# Patient Record
Sex: MALE | Race: BLACK OR AFRICAN AMERICAN | NOT HISPANIC OR LATINO | Employment: OTHER | URBAN - METROPOLITAN AREA
[De-identification: names, ages, dates, MRNs, and addresses within clinical notes are randomized per-mention and may not be internally consistent; named-entity substitution may affect disease eponyms.]

---

## 2023-03-07 DIAGNOSIS — I10 PRIMARY HYPERTENSION: Primary | ICD-10-CM

## 2023-03-07 RX ORDER — CARVEDILOL 25 MG/1
25 TABLET ORAL
Qty: 180 TABLET | Refills: 1 | Status: SHIPPED | OUTPATIENT
Start: 2023-03-07 | End: 2023-03-08 | Stop reason: SDUPTHER

## 2023-03-08 DIAGNOSIS — I10 PRIMARY HYPERTENSION: ICD-10-CM

## 2023-03-08 RX ORDER — CARVEDILOL 25 MG/1
25 TABLET ORAL
Qty: 180 TABLET | Refills: 1 | Status: SHIPPED | OUTPATIENT
Start: 2023-03-08 | End: 2023-11-27 | Stop reason: SDUPTHER

## 2023-06-27 ENCOUNTER — APPOINTMENT (OUTPATIENT)
Dept: PRIMARY CARE | Facility: CLINIC | Age: 63
End: 2023-06-27
Payer: COMMERCIAL

## 2023-08-22 DIAGNOSIS — I10 ESSENTIAL (PRIMARY) HYPERTENSION: ICD-10-CM

## 2023-08-22 RX ORDER — HYDRALAZINE HYDROCHLORIDE 10 MG/1
10 TABLET, FILM COATED ORAL 3 TIMES DAILY
Qty: 270 TABLET | Refills: 1 | Status: SHIPPED | OUTPATIENT
Start: 2023-08-22 | End: 2024-03-08

## 2023-10-23 ENCOUNTER — TELEPHONE (OUTPATIENT)
Dept: HEMATOLOGY/ONCOLOGY | Facility: CLINIC | Age: 63
End: 2023-10-23
Payer: COMMERCIAL

## 2023-10-23 NOTE — TELEPHONE ENCOUNTER
Multiple messages  Patient been non compliant with fup  Left message again to contact us if interested in follow up.

## 2023-11-14 ENCOUNTER — TELEPHONE (OUTPATIENT)
Dept: PRIMARY CARE | Facility: CLINIC | Age: 63
End: 2023-11-14
Payer: COMMERCIAL

## 2023-11-14 DIAGNOSIS — I13.0 HYPERTENSIVE HEART DISEASE WITH CONGESTIVE HEART FAILURE AND CHRONIC KIDNEY DISEASE, UNSPECIFIED CKD STAGE, UNSPECIFIED HEART FAILURE TYPE (MULTI): Primary | ICD-10-CM

## 2023-11-27 ENCOUNTER — OFFICE VISIT (OUTPATIENT)
Dept: PRIMARY CARE | Facility: CLINIC | Age: 63
End: 2023-11-27
Payer: COMMERCIAL

## 2023-11-27 ENCOUNTER — ANCILLARY PROCEDURE (OUTPATIENT)
Dept: RADIOLOGY | Facility: CLINIC | Age: 63
End: 2023-11-27
Payer: COMMERCIAL

## 2023-11-27 VITALS
WEIGHT: 315 LBS | TEMPERATURE: 97.5 F | HEART RATE: 71 BPM | HEIGHT: 69 IN | BODY MASS INDEX: 46.65 KG/M2 | SYSTOLIC BLOOD PRESSURE: 220 MMHG | OXYGEN SATURATION: 97 % | DIASTOLIC BLOOD PRESSURE: 120 MMHG

## 2023-11-27 DIAGNOSIS — M25.531 WRIST PAIN, ACUTE, RIGHT: Primary | ICD-10-CM

## 2023-11-27 DIAGNOSIS — M25.531 WRIST PAIN, ACUTE, RIGHT: ICD-10-CM

## 2023-11-27 DIAGNOSIS — I13.0 HYPERTENSIVE HEART DISEASE WITH CONGESTIVE HEART FAILURE AND CHRONIC KIDNEY DISEASE, UNSPECIFIED CKD STAGE, UNSPECIFIED HEART FAILURE TYPE (MULTI): ICD-10-CM

## 2023-11-27 DIAGNOSIS — I10 PRIMARY HYPERTENSION: ICD-10-CM

## 2023-11-27 PROCEDURE — 99214 OFFICE O/P EST MOD 30 MIN: CPT | Performed by: FAMILY MEDICINE

## 2023-11-27 PROCEDURE — 1036F TOBACCO NON-USER: CPT | Performed by: FAMILY MEDICINE

## 2023-11-27 PROCEDURE — 73110 X-RAY EXAM OF WRIST: CPT | Mod: RIGHT SIDE | Performed by: RADIOLOGY

## 2023-11-27 PROCEDURE — 3008F BODY MASS INDEX DOCD: CPT | Performed by: FAMILY MEDICINE

## 2023-11-27 PROCEDURE — 3077F SYST BP >= 140 MM HG: CPT | Performed by: FAMILY MEDICINE

## 2023-11-27 PROCEDURE — 3080F DIAST BP >= 90 MM HG: CPT | Performed by: FAMILY MEDICINE

## 2023-11-27 PROCEDURE — 73110 X-RAY EXAM OF WRIST: CPT | Mod: RT,FY

## 2023-11-27 RX ORDER — CARVEDILOL 25 MG/1
25 TABLET ORAL
Qty: 180 TABLET | Refills: 1 | Status: SHIPPED | OUTPATIENT
Start: 2023-11-27 | End: 2024-05-25

## 2023-11-27 RX ORDER — CHLORTHALIDONE 25 MG/1
TABLET ORAL
COMMUNITY
Start: 2023-11-14 | End: 2024-02-22

## 2023-11-27 RX ORDER — GABAPENTIN 100 MG/1
100 CAPSULE ORAL
Qty: 90 CAPSULE | Refills: 0 | Status: SHIPPED | OUTPATIENT
Start: 2023-11-27

## 2023-11-27 RX ORDER — TRAMADOL HYDROCHLORIDE 50 MG/1
50 TABLET ORAL EVERY 6 HOURS PRN
Qty: 15 TABLET | Refills: 0 | Status: SHIPPED | OUTPATIENT
Start: 2023-11-27 | End: 2023-12-04

## 2023-11-27 RX ORDER — APIXABAN 5 MG/1
5 TABLET, FILM COATED ORAL 2 TIMES DAILY
COMMUNITY
Start: 2023-06-16

## 2023-11-27 RX ORDER — GABAPENTIN 100 MG/1
1 CAPSULE ORAL
COMMUNITY
Start: 2023-01-13 | End: 2023-11-27 | Stop reason: SDUPTHER

## 2023-11-27 ASSESSMENT — PATIENT HEALTH QUESTIONNAIRE - PHQ9
1. LITTLE INTEREST OR PLEASURE IN DOING THINGS: NOT AT ALL
SUM OF ALL RESPONSES TO PHQ9 QUESTIONS 1 AND 2: 0
2. FEELING DOWN, DEPRESSED OR HOPELESS: NOT AT ALL

## 2023-11-27 ASSESSMENT — ENCOUNTER SYMPTOMS
DEPRESSION: 0
LOSS OF SENSATION IN FEET: 0
OCCASIONAL FEELINGS OF UNSTEADINESS: 0

## 2023-11-27 ASSESSMENT — PAIN SCALES - GENERAL: PAINLEVEL: 10-WORST PAIN EVER

## 2023-11-27 NOTE — PROGRESS NOTES
"Subjective   Patient ID: Rei Alcocer is a 62 y.o. male who presents for Wrist Pain and Joint Swelling.  Seeing prostate providers, no other specialists currently.  Did not take BP meds this afternoon.  Had run out of one of the medications (carvedilol?).    Complains of right wrist pain starting yesterday, acute in onset.  No trauma  Started light dumbbells.  No redness or warmth.  No numbness or tingling.  Wearing wrist brace for support.  Pain is worse with movement at wrist.  Able to move fingers without difficulty.  Took a gabapentin from wife and it helped some with the pain.  Had stopped taking his own gabapentin, but would like to restart.    I have personally reviewed the OARRS report for this patient.  This report is documented into the EMR.  I have considered the risks of abuse, dependence, addiction and diversion.          Review of Systems    Objective   BP (!) 220/120 (BP Location: Left arm, Patient Position: Sitting, BP Cuff Size: Large adult)   Pulse 71   Temp 36.4 °C (97.5 °F) (Oral)   Ht 1.753 m (5' 9\")   Wt 150 kg (330 lb)   SpO2 97%   BMI 48.73 kg/m²     Physical Exam  Vitals (repeat BP no better) reviewed.   Constitutional:       Appearance: Normal appearance.   Cardiovascular:      Rate and Rhythm: Normal rate and regular rhythm.   Pulmonary:      Effort: Pulmonary effort is normal.      Breath sounds: Normal breath sounds.   Musculoskeletal:      Right wrist: Swelling and tenderness (more on ulnar side) present. No snuff box tenderness or crepitus. Decreased range of motion. Normal pulse.      Right hand: Swelling (puffy) present. No deformity, tenderness or bony tenderness. Normal range of motion. Normal strength.      Right lower leg: No edema.      Left lower leg: No edema.   Neurological:      Mental Status: He is alert.           Assessment/Plan   Problem List Items Addressed This Visit       Hypertensive heart disease with congestive heart failure and chronic kidney disease " (CMS/Formerly [facility])    Relevant Medications    carvedilol (Coreg) 25 mg tablet    Other Relevant Orders    Disability Placard     Other Visit Diagnoses       Wrist pain, acute, right    -  Primary    Relevant Medications    traMADol (Ultram) 50 mg tablet    gabapentin (Neurontin) 100 mg capsule    Other Relevant Orders    XR wrist right 3+ views    Primary hypertension        Relevant Medications    carvedilol (Coreg) 25 mg tablet

## 2023-11-28 NOTE — PATIENT INSTRUCTIONS
Acute right wrist pain without obvious trauma.  Check stat X-ray to rule out fracture.  For pain, tramadol sent to pharmacy for limited use.  Continue wrist splint for support.  If no fracture, may treat for possible gout, although there is no redness or warmth.    Gabapentin restarted.    BP extremely high.  Take medication when you get home.  Carvedilol refilled, since may have been out.  Monitor BP at home.  Follow up for routine check.

## 2023-11-29 DIAGNOSIS — M25.531 WRIST PAIN, ACUTE, RIGHT: Primary | ICD-10-CM

## 2023-11-29 RX ORDER — METHYLPREDNISOLONE 4 MG/1
TABLET ORAL
Qty: 21 TABLET | Refills: 0 | Status: SHIPPED | OUTPATIENT
Start: 2023-11-29 | End: 2024-02-07 | Stop reason: WASHOUT

## 2023-12-14 ENCOUNTER — TELEPHONE (OUTPATIENT)
Dept: PRIMARY CARE | Facility: CLINIC | Age: 63
End: 2023-12-14
Payer: COMMERCIAL

## 2023-12-14 DIAGNOSIS — M25.531 WRIST PAIN, ACUTE, RIGHT: ICD-10-CM

## 2023-12-14 DIAGNOSIS — R73.09 ELEVATED GLUCOSE: ICD-10-CM

## 2023-12-14 DIAGNOSIS — I10 PRIMARY HYPERTENSION: Primary | ICD-10-CM

## 2023-12-29 ENCOUNTER — TELEPHONE (OUTPATIENT)
Dept: HEMATOLOGY/ONCOLOGY | Facility: HOSPITAL | Age: 63
End: 2023-12-29
Payer: COMMERCIAL

## 2023-12-29 NOTE — TELEPHONE ENCOUNTER
RN called and left message on voice mail for patient to call back. Call back instructions reviewed.

## 2024-01-02 ENCOUNTER — PATIENT OUTREACH (OUTPATIENT)
Dept: HEMATOLOGY/ONCOLOGY | Facility: HOSPITAL | Age: 64
End: 2024-01-02
Payer: COMMERCIAL

## 2024-01-02 NOTE — PROGRESS NOTES
RN talked to patient. Patient asked why he was seeing this patient and RN told hime because his counts are low and that Dr. Perez referred him. Patient notified of date, time and place. Understanding voiced. RN could not tell for sure that the patient will show for appointment. Call back instructions reviewed.

## 2024-01-03 ENCOUNTER — LAB (OUTPATIENT)
Dept: LAB | Facility: HOSPITAL | Age: 64
End: 2024-01-03
Payer: COMMERCIAL

## 2024-01-03 ENCOUNTER — OFFICE VISIT (OUTPATIENT)
Dept: HEMATOLOGY/ONCOLOGY | Facility: HOSPITAL | Age: 64
End: 2024-01-03
Payer: COMMERCIAL

## 2024-01-03 VITALS
SYSTOLIC BLOOD PRESSURE: 216 MMHG | RESPIRATION RATE: 20 BRPM | OXYGEN SATURATION: 98 % | DIASTOLIC BLOOD PRESSURE: 92 MMHG | TEMPERATURE: 97.7 F | HEART RATE: 64 BPM

## 2024-01-03 DIAGNOSIS — D61.818 PANCYTOPENIA (MULTI): Primary | ICD-10-CM

## 2024-01-03 DIAGNOSIS — D61.818 PANCYTOPENIA (MULTI): ICD-10-CM

## 2024-01-03 DIAGNOSIS — M25.531 WRIST PAIN, ACUTE, RIGHT: ICD-10-CM

## 2024-01-03 DIAGNOSIS — I10 PRIMARY HYPERTENSION: ICD-10-CM

## 2024-01-03 DIAGNOSIS — R73.09 ELEVATED GLUCOSE: ICD-10-CM

## 2024-01-03 LAB
ALBUMIN SERPL BCP-MCNC: 3.7 G/DL (ref 3.4–5)
ALP SERPL-CCNC: 85 U/L (ref 33–136)
ALT SERPL W P-5'-P-CCNC: 8 U/L (ref 10–52)
ANION GAP SERPL CALC-SCNC: 12 MMOL/L (ref 10–20)
APTT PPP: 59 SECONDS (ref 27–38)
AST SERPL W P-5'-P-CCNC: 13 U/L (ref 9–39)
B2 GLYCOPROT1 IGA SER-ACNC: 0.9 U/ML
B2 GLYCOPROT1 IGG SER-ACNC: <1.4 U/ML
B2 GLYCOPROT1 IGM SER-ACNC: 1.8 U/ML
BASOPHILS # BLD AUTO: 0.02 X10*3/UL (ref 0–0.1)
BASOPHILS NFR BLD AUTO: 0.4 %
BILIRUB DIRECT SERPL-MCNC: 0.1 MG/DL (ref 0–0.3)
BILIRUB SERPL-MCNC: 0.5 MG/DL (ref 0–1.2)
BUN SERPL-MCNC: 26 MG/DL (ref 6–23)
CALCIUM SERPL-MCNC: 9.4 MG/DL (ref 8.6–10.3)
CARDIOLIPIN IGA SERPL-ACNC: 0.6 APL U/ML
CARDIOLIPIN IGG SER IA-ACNC: <1.6 GPL U/ML
CARDIOLIPIN IGM SER IA-ACNC: 1 MPL U/ML
CENTROMERE B AB SER-ACNC: <0.2 AI
CHLORIDE SERPL-SCNC: 105 MMOL/L (ref 98–107)
CHOLEST SERPL-MCNC: 172 MG/DL (ref 0–199)
CHOLESTEROL/HDL RATIO: 3.1
CHROMATIN AB SERPL-ACNC: <0.2 AI
CO2 SERPL-SCNC: 25 MMOL/L (ref 21–32)
CREAT SERPL-MCNC: 1.97 MG/DL (ref 0.5–1.3)
CRP SERPL-MCNC: 5.91 MG/DL
D DIMER PPP FEU-MCNC: 1309 NG/ML FEU
DAT-POLYSPECIFIC: NORMAL
DSDNA AB SER-ACNC: <1 IU/ML
ENA JO1 AB SER QL IA: <0.2 AI
ENA RNP AB SER IA-ACNC: 0.4 AI
ENA SCL70 AB SER QL IA: <0.2 AI
ENA SM AB SER IA-ACNC: <0.2 AI
ENA SM+RNP AB SER QL IA: <0.2 AI
ENA SS-A AB SER IA-ACNC: <0.2 AI
ENA SS-B AB SER IA-ACNC: <0.2 AI
EOSINOPHIL # BLD AUTO: 0.15 X10*3/UL (ref 0–0.7)
EOSINOPHIL NFR BLD AUTO: 3.2 %
ERYTHROCYTE [DISTWIDTH] IN BLOOD BY AUTOMATED COUNT: 15.1 % (ref 11.5–14.5)
ERYTHROCYTE [SEDIMENTATION RATE] IN BLOOD BY WESTERGREN METHOD: 95 MM/H (ref 0–20)
EST. AVERAGE GLUCOSE BLD GHB EST-MCNC: 123 MG/DL
FERRITIN SERPL-MCNC: 195 NG/ML (ref 20–300)
FIBRINOGEN PPP-MCNC: 614 MG/DL (ref 200–400)
GFR SERPL CREATININE-BSD FRML MDRD: 38 ML/MIN/1.73M*2
GLUCOSE SERPL-MCNC: 91 MG/DL (ref 74–99)
HBA1C MFR BLD: 5.9 %
HCT VFR BLD AUTO: 38.9 % (ref 41–52)
HCV AB SER QL: NONREACTIVE
HDLC SERPL-MCNC: 56 MG/DL
HGB BLD-MCNC: 12.5 G/DL (ref 13.5–17.5)
HGB RETIC QN: 29 PG (ref 28–38)
HIV 1+2 AB+HIV1 P24 AG SERPL QL IA: NONREACTIVE
IMM GRANULOCYTES # BLD AUTO: 0.01 X10*3/UL (ref 0–0.7)
IMM GRANULOCYTES NFR BLD AUTO: 0.2 % (ref 0–0.9)
IMMATURE RETIC FRACTION: 22.7 %
INR PPP: 1.1 (ref 0.9–1.1)
IRON SATN MFR SERPL: 17 % (ref 25–45)
IRON SERPL-MCNC: 42 UG/DL (ref 35–150)
LDH SERPL L TO P-CCNC: 156 U/L (ref 84–246)
LDLC SERPL CALC-MCNC: 95 MG/DL
LYMPHOCYTES # BLD AUTO: 0.49 X10*3/UL (ref 1.2–4.8)
LYMPHOCYTES NFR BLD AUTO: 10.5 %
MCH RBC QN AUTO: 26.4 PG (ref 26–34)
MCHC RBC AUTO-ENTMCNC: 32.1 G/DL (ref 32–36)
MCV RBC AUTO: 82 FL (ref 80–100)
MONOCYTES # BLD AUTO: 0.39 X10*3/UL (ref 0.1–1)
MONOCYTES NFR BLD AUTO: 8.4 %
NEUTROPHILS # BLD AUTO: 3.6 X10*3/UL (ref 1.2–7.7)
NEUTROPHILS NFR BLD AUTO: 77.3 %
NON HDL CHOLESTEROL: 116 MG/DL (ref 0–149)
NRBC BLD-RTO: 0 /100 WBCS (ref 0–0)
PLATELET # BLD AUTO: 160 X10*3/UL (ref 150–450)
POTASSIUM SERPL-SCNC: 3.5 MMOL/L (ref 3.5–5.3)
PROT SERPL-MCNC: 7.5 G/DL (ref 6.4–8.2)
PROT SERPL-MCNC: 8.2 G/DL (ref 6.4–8.2)
PROTHROMBIN TIME: 12.5 SECONDS (ref 9.8–12.8)
RBC # BLD AUTO: 4.73 X10*6/UL (ref 4.5–5.9)
RETICS #: 0.07 X10*6/UL (ref 0.02–0.12)
RETICS/RBC NFR AUTO: 1.5 % (ref 0.5–2)
RIBOSOMAL P AB SER-ACNC: <0.2 AI
SODIUM SERPL-SCNC: 138 MMOL/L (ref 136–145)
TIBC SERPL-MCNC: 247 UG/DL (ref 240–445)
TRIGL SERPL-MCNC: 106 MG/DL (ref 0–149)
TSH SERPL-ACNC: 2.28 MIU/L (ref 0.44–3.98)
UIBC SERPL-MCNC: 205 UG/DL (ref 110–370)
URATE SERPL-MCNC: 10.5 MG/DL (ref 4–7.5)
VIT B12 SERPL-MCNC: 385 PG/ML (ref 211–911)
VLDL: 21 MG/DL (ref 0–40)
WBC # BLD AUTO: 4.7 X10*3/UL (ref 4.4–11.3)

## 2024-01-03 PROCEDURE — 80053 COMPREHEN METABOLIC PANEL: CPT

## 2024-01-03 PROCEDURE — 84155 ASSAY OF PROTEIN SERUM: CPT | Mod: 59

## 2024-01-03 PROCEDURE — 82728 ASSAY OF FERRITIN: CPT

## 2024-01-03 PROCEDURE — 86140 C-REACTIVE PROTEIN: CPT

## 2024-01-03 PROCEDURE — 86880 COOMBS TEST DIRECT: CPT

## 2024-01-03 PROCEDURE — 84165 PROTEIN E-PHORESIS SERUM: CPT

## 2024-01-03 PROCEDURE — 85384 FIBRINOGEN ACTIVITY: CPT

## 2024-01-03 PROCEDURE — 85652 RBC SED RATE AUTOMATED: CPT

## 2024-01-03 PROCEDURE — 87389 HIV-1 AG W/HIV-1&-2 AB AG IA: CPT

## 2024-01-03 PROCEDURE — 86146 BETA-2 GLYCOPROTEIN ANTIBODY: CPT

## 2024-01-03 PROCEDURE — 86023 IMMUNOGLOBULIN ASSAY: CPT

## 2024-01-03 PROCEDURE — 85610 PROTHROMBIN TIME: CPT

## 2024-01-03 PROCEDURE — 85025 COMPLETE CBC W/AUTO DIFF WBC: CPT

## 2024-01-03 PROCEDURE — 84550 ASSAY OF BLOOD/URIC ACID: CPT

## 2024-01-03 PROCEDURE — 82607 VITAMIN B-12: CPT

## 2024-01-03 PROCEDURE — 3008F BODY MASS INDEX DOCD: CPT | Performed by: INTERNAL MEDICINE

## 2024-01-03 PROCEDURE — 84165 PROTEIN E-PHORESIS SERUM: CPT | Performed by: STUDENT IN AN ORGANIZED HEALTH CARE EDUCATION/TRAINING PROGRAM

## 2024-01-03 PROCEDURE — 85730 THROMBOPLASTIN TIME PARTIAL: CPT

## 2024-01-03 PROCEDURE — 84443 ASSAY THYROID STIM HORMONE: CPT

## 2024-01-03 PROCEDURE — 82784 ASSAY IGA/IGD/IGG/IGM EACH: CPT

## 2024-01-03 PROCEDURE — 85379 FIBRIN DEGRADATION QUANT: CPT

## 2024-01-03 PROCEDURE — 86235 NUCLEAR ANTIGEN ANTIBODY: CPT

## 2024-01-03 PROCEDURE — 99215 OFFICE O/P EST HI 40 MIN: CPT | Performed by: INTERNAL MEDICINE

## 2024-01-03 PROCEDURE — 86147 CARDIOLIPIN ANTIBODY EA IG: CPT

## 2024-01-03 PROCEDURE — 83615 LACTATE (LD) (LDH) ENZYME: CPT

## 2024-01-03 PROCEDURE — 83036 HEMOGLOBIN GLYCOSYLATED A1C: CPT

## 2024-01-03 PROCEDURE — 82248 BILIRUBIN DIRECT: CPT

## 2024-01-03 PROCEDURE — 1036F TOBACCO NON-USER: CPT | Performed by: INTERNAL MEDICINE

## 2024-01-03 PROCEDURE — 86803 HEPATITIS C AB TEST: CPT

## 2024-01-03 PROCEDURE — 36415 COLL VENOUS BLD VENIPUNCTURE: CPT

## 2024-01-03 PROCEDURE — 86038 ANTINUCLEAR ANTIBODIES: CPT

## 2024-01-03 PROCEDURE — 80061 LIPID PANEL: CPT

## 2024-01-03 PROCEDURE — 83521 IG LIGHT CHAINS FREE EACH: CPT

## 2024-01-03 PROCEDURE — 85045 AUTOMATED RETICULOCYTE COUNT: CPT

## 2024-01-03 PROCEDURE — 82668 ASSAY OF ERYTHROPOIETIN: CPT

## 2024-01-03 PROCEDURE — 83010 ASSAY OF HAPTOGLOBIN QUANT: CPT

## 2024-01-03 PROCEDURE — 83540 ASSAY OF IRON: CPT

## 2024-01-03 ASSESSMENT — ENCOUNTER SYMPTOMS
LOSS OF SENSATION IN FEET: 0
OCCASIONAL FEELINGS OF UNSTEADINESS: 0
DEPRESSION: 0

## 2024-01-03 ASSESSMENT — PATIENT HEALTH QUESTIONNAIRE - PHQ9
1. LITTLE INTEREST OR PLEASURE IN DOING THINGS: NOT AT ALL
2. FEELING DOWN, DEPRESSED OR HOPELESS: NOT AT ALL
SUM OF ALL RESPONSES TO PHQ9 QUESTIONS 1 AND 2: 0

## 2024-01-03 ASSESSMENT — COLUMBIA-SUICIDE SEVERITY RATING SCALE - C-SSRS
1. IN THE PAST MONTH, HAVE YOU WISHED YOU WERE DEAD OR WISHED YOU COULD GO TO SLEEP AND NOT WAKE UP?: NO
2. HAVE YOU ACTUALLY HAD ANY THOUGHTS OF KILLING YOURSELF?: NO
6. HAVE YOU EVER DONE ANYTHING, STARTED TO DO ANYTHING, OR PREPARED TO DO ANYTHING TO END YOUR LIFE?: NO

## 2024-01-03 ASSESSMENT — PAIN SCALES - GENERAL: PAINLEVEL: 0-NO PAIN

## 2024-01-03 NOTE — PROGRESS NOTES
Patient ID: Rei Alcocer is a 63 y.o. male.  Referring Physician: No referring provider defined for this encounter.  Primary Care Provider: Erin Leslie MD  Visit Type:  Initial Visit     Chief complaint: low blood counts    HPI    Mr Alcocer is a 62 yo M who is referred to Hematology clinic for pancytopenia evaluation.     Patient comes in accompanied by his wife, Mile. He has no major complaints today. Feels well overall. Denies any fevers, chills, night sweats, weight loss, new cp, sob, new lumps, bone pain, bleeding, recent infections or new medications. He completed radiation to prostate and nodes during summer. He is on ADT (he believed last shot September) which gives him hot flashes, low energy and weight gain. He is very hypertensive today 216/92 and reports at home it has been similar. He has taken all his anti-HTN meds this AM. He is currently not established with Nephrology or Cardiology.     PMH:   Prostate Cancer - Stage IV   Primary Oncologist: Dr Perez  Radiation Oncologist: Dr Masters    Sites of disease - bone (?), nodes    Treatment Hx:   Prostate adenocarcinoma diagnosed in 02/2020, GS 4+4, iPSA 107, cT3b by MR, N1, uptake on bone scan in right ischium without  CT or MR correlate of bone lesion.     He was started on ADT in 02/2020, apalutamide was then added with unclear duration of treatment as patient d/c'd med by himself.  Treatment course has been complicated by hypertensive crisis, and more recently admission  for kidney stones status post placement of ureteral stent. His last dose of ADT was 11/2021, and he is not been on any therapy in this past year.      1/24/2020 MRI prostate noted diffusely infiltrative mass involving the entire prostate with invasion bilateral seminal vesicles, and large metastatic iliac lymph nodes. His 2/5/2020 bone scan did note focal abnormality in the right ischium, without anatomic  correlation on the 12/21/19 diagnostic CT nor the 1/24/2020 MRI.       2/16/2023 PSMA PET redemonstrated avidity in prostate and seminal vesicle, right external iliac node, without evidence of distant oxana, bone, or visceral metastasis.    3/9/2023: Starts ADT- 3m Trelstar and Radiation to Prostate and node 5/23/23 - 6/26/2023 (daily treatments over 20 fractions)    Other medical conditions: HTN, CAD, Depression, HFpEF, A-fib (on Eliquis), CKD, Kidney stones and urethral stricture s/p urethroplasty and stent     PSH: Urethroplasty, ankle surgery for fracture     Family history: father with prostate cancer, mother with lung cancer, paternal uncle with prostate cancer. No fam hx of anemia.     Social history: Retired . Never smoker, denies alcohol or recreational drug use. Marries, no children.     Review of Systems - Oncology     Objective   BSA: There is no height or weight on file to calculate BSA.  BP (!) 216/92   Pulse 64   Temp 36.5 °C (97.7 °F) (Core)   Resp 20   SpO2 98%      has no past medical history on file.   has a past surgical history that includes Other surgical history (10/23/2020); Other surgical history (10/23/2020); and IR aspiration of bladder by needle insertion of suprapubic catheter (12/23/2019).  No family history on file.  Oncology History    No history exists.       Rei ANGULO Alcocer  reports that he has never smoked. He has never used smokeless tobacco.  He  reports that he does not currently use alcohol.  He  reports no history of drug use.    Physical Exam     Gen: awake, alert, in no acute distress, obese  HEENT: no LAD  CV: RRR, no m/r/g  Pulm: CTAB, without w/r/r  Abd: soft, NT/ND  Ext: bl LLEE edema  Skin: warm and dry  Neuro: A&Ox4, moves all 4 extremities spontaneously     WBC   Date/Time Value Ref Range Status   06/23/2023 02:57 PM 3.6 (L) 4.4 - 11.3 x10E9/L Final   06/19/2023 11:50 AM 4.0 (L) 4.4 - 11.3 x10E9/L Final   04/04/2023 01:50 PM 5.7 4.4 - 11.3 x10E9/L Final     nRBC   Date Value Ref Range Status   12/30/2022 0.0 0.0 - 0.0  "/100 WBC Final   12/28/2022 0.0 0.0 - 0.0 /100 WBC Final   12/27/2022 0.0 0.0 - 0.0 /100 WBC Final     RBC   Date Value Ref Range Status   06/23/2023 4.47 (L) 4.50 - 5.90 x10E12/L Final   06/19/2023 4.65 4.50 - 5.90 x10E12/L Final   04/04/2023 4.84 4.50 - 5.90 x10E12/L Final     Hemoglobin   Date Value Ref Range Status   06/23/2023 11.9 (L) 13.5 - 17.5 g/dL Final   06/19/2023 12.2 (L) 13.5 - 17.5 g/dL Final   04/04/2023 12.7 (L) 13.5 - 17.5 g/dL Final     Hematocrit   Date Value Ref Range Status   06/23/2023 36.7 (L) 41.0 - 52.0 % Final   06/19/2023 38.6 (L) 41.0 - 52.0 % Final   04/04/2023 40.4 (L) 41.0 - 52.0 % Final     MCV   Date/Time Value Ref Range Status   06/23/2023 02:57 PM 82 80 - 100 fL Final   06/19/2023 11:50 AM 83 80 - 100 fL Final   04/04/2023 01:50 PM 83 80 - 100 fL Final     No results found for: \"MCH\"  MCHC   Date/Time Value Ref Range Status   06/23/2023 02:57 PM 32.4 32.0 - 36.0 g/dL Final   06/19/2023 11:50 AM 31.6 (L) 32.0 - 36.0 g/dL Final   04/04/2023 01:50 PM 31.4 (L) 32.0 - 36.0 g/dL Final     RDW   Date/Time Value Ref Range Status   06/23/2023 02:57 PM 14.4 11.5 - 14.5 % Final   06/19/2023 11:50 AM 14.4 11.5 - 14.5 % Final   04/04/2023 01:50 PM 15.4 (H) 11.5 - 14.5 % Final     Platelets   Date/Time Value Ref Range Status   06/23/2023 02:57  (L) 150 - 450 x10E9/L Final   06/19/2023 11:50  (L) 150 - 450 x10E9/L Final   04/04/2023 01:50  150 - 450 x10E9/L Final     No results found for: \"MPV\"  Neutrophils %   Date/Time Value Ref Range Status   06/23/2023 02:57 PM 75.2 40.0 - 80.0 % Final   06/19/2023 11:50 AM 81.6 40.0 - 80.0 % Final   04/04/2023 01:50 PM 65.6 40.0 - 80.0 % Final     Immature Granulocytes %, Automated   Date/Time Value Ref Range Status   06/23/2023 02:57 PM 0.3 0.0 - 0.9 % Final     Comment:      Immature Granulocyte Count (IG) includes promyelocytes,    myelocytes and metamyelocytes but does not include bands.   Percent differential counts (%) should be " "interpreted in the   context of the absolute cell counts (cells/L).     06/19/2023 11:50 AM 0.5 0.0 - 0.9 % Final     Comment:      Immature Granulocyte Count (IG) includes promyelocytes,    myelocytes and metamyelocytes but does not include bands.   Percent differential counts (%) should be interpreted in the   context of the absolute cell counts (cells/L).     04/04/2023 01:50 PM 0.4 0.0 - 0.9 % Final     Comment:      Immature Granulocyte Count (IG) includes promyelocytes,    myelocytes and metamyelocytes but does not include bands.   Percent differential counts (%) should be interpreted in the   context of the absolute cell counts (cells/L).       Lymphocytes %   Date/Time Value Ref Range Status   06/23/2023 02:57 PM 4.7 13.0 - 44.0 % Final   06/19/2023 11:50 AM 4.7 13.0 - 44.0 % Final   04/04/2023 01:50 PM 22.6 13.0 - 44.0 % Final     Monocytes %   Date/Time Value Ref Range Status   06/23/2023 02:57 PM 15.3 2.0 - 10.0 % Final   06/19/2023 11:50 AM 9.7 2.0 - 10.0 % Final   04/04/2023 01:50 PM 8.8 2.0 - 10.0 % Final     Eosinophils %   Date/Time Value Ref Range Status   06/23/2023 02:57 PM 3.9 0.0 - 6.0 % Final   06/19/2023 11:50 AM 3.0 0.0 - 6.0 % Final   04/04/2023 01:50 PM 2.1 0.0 - 6.0 % Final     Basophils %   Date/Time Value Ref Range Status   06/23/2023 02:57 PM 0.6 0.0 - 2.0 % Final   06/19/2023 11:50 AM 0.5 0.0 - 2.0 % Final   04/04/2023 01:50 PM 0.5 0.0 - 2.0 % Final     Neutrophils Absolute   Date/Time Value Ref Range Status   06/23/2023 02:57 PM 2.70 1.20 - 7.70 x10E9/L Final   06/19/2023 11:50 AM 3.29 1.20 - 7.70 x10E9/L Final   04/04/2023 01:50 PM 3.75 1.20 - 7.70 x10E9/L Final     No results found for: \"IGABSOL\"  Lymphocytes Absolute   Date/Time Value Ref Range Status   06/23/2023 02:57 PM 0.17 (L) 1.20 - 4.80 x10E9/L Final   06/19/2023 11:50 AM 0.19 (L) 1.20 - 4.80 x10E9/L Final   04/04/2023 01:50 PM 1.29 1.20 - 4.80 x10E9/L Final     Monocytes Absolute   Date/Time Value Ref Range Status " "  06/23/2023 02:57 PM 0.55 0.10 - 1.00 x10E9/L Final   06/19/2023 11:50 AM 0.39 0.10 - 1.00 x10E9/L Final   04/04/2023 01:50 PM 0.50 0.10 - 1.00 x10E9/L Final     Eosinophils Absolute   Date/Time Value Ref Range Status   06/23/2023 02:57 PM 0.14 0.00 - 0.70 x10E9/L Final   06/19/2023 11:50 AM 0.12 0.00 - 0.70 x10E9/L Final   04/04/2023 01:50 PM 0.12 0.00 - 0.70 x10E9/L Final     Basophils Absolute   Date/Time Value Ref Range Status   06/23/2023 02:57 PM 0.02 0.00 - 0.10 x10E9/L Final   06/19/2023 11:50 AM 0.02 0.00 - 0.10 x10E9/L Final   04/04/2023 01:50 PM 0.03 0.00 - 0.10 x10E9/L Final       No components found for: \"PT\"  aPTT   Date/Time Value Ref Range Status   10/06/2020 06:23 AM 44 (H) 25 - 35 sec Final     Comment:       THE APTT IS NO LONGER USED FOR MONITORING     UNFRACTIONATED HEPARIN THERAPY.    FOR MONITORING HEPARIN THERAPY,     USE THE HEPARIN ASSAY.     12/19/2019 01:59 PM 40 (H) 28 - 38 sec Final     Comment:       THE APTT IS NO LONGER USED FOR MONITORING     UNFRACTIONATED HEPARIN THERAPY.    FOR MONITORING HEPARIN THERAPY,     USE THE HEPARIN ASSAY.     04/06/2019 05:47 AM 43 (H) 28 - 38 sec Final     Comment:       THE APTT IS NO LONGER USED FOR MONITORING     UNFRACTIONATED HEPARIN THERAPY.    FOR MONITORING HEPARIN THERAPY,     USE THE HEPARIN ASSAY.       Labs: creatinine 1.97; GFR=38 (Stage 3 CRF). UA=10.5; Vit V58=008; d-zqold=4437; aPTT=59; PT=12.5; Hgb=12.5; PCV=38.8; plts=160; QQD=2025 with 77% PMNs, 10.5% lymphs. CRP=5.91;     Assessment/Plan      Mr Alcocer is a 62 yo M PMH of metastatic prostate cancer on ADT and recent completion of radiation, who is referred to Hematology clinic for pancytopenia evaluation.     He is mostly asymptomatic. Labs show new mild leucopenia and thrombocytopenia in 2 occasions in June. Anemia longstanding and mild with Hgb 12; most likely from CKD. No labs since then.  Plt and WBC have improved since referral.  Long aPTT could be due to Eliquis; PT=normal. "     Differential is broad at this point and requires additional wup. One important aspect to consider is his recent pelvic radiation which can affect directly the bone marrow.     One other concern is his uncontrolled hypertension. Today /92 despite taking all anti-HTN meds this AM. Encouraged to fup HTN management with PCP and consider seeing Cardiology and/or Nephrology. Recommended to reduce salt intake and start physical activity.     RTC in person in 2-3 weeks to review results.  Need all the results in to have a full assessment of this patient.    Pt seen and discussed with Dr Sierra.     Karen Baldwin MD  Hematology Oncology fellow, PGY-5

## 2024-01-04 LAB
ANA PATTERN: ABNORMAL
ANA SER QL HEP2 SUBST: POSITIVE
ANA TITR SER IF: ABNORMAL {TITER}
EPO SERPL-ACNC: 26 MU/ML (ref 4–27)
HAPTOGLOB SERPL-MCNC: 161 MG/DL (ref 37–246)
IGA SERPL-MCNC: 497 MG/DL (ref 70–400)
IGG SERPL-MCNC: 2050 MG/DL (ref 700–1600)
IGM SERPL-MCNC: 79 MG/DL (ref 40–230)
KAPPA LC SERPL-MCNC: 9.77 MG/DL (ref 0.33–1.94)
KAPPA LC/LAMBDA SER: 2.18 {RATIO} (ref 0.26–1.65)
LAMBDA LC SERPL-MCNC: 4.48 MG/DL (ref 0.57–2.63)

## 2024-01-05 LAB
PLATELET ANTIBODY TARGET 1 IGG RESULT: NEGATIVE
PLATELET ANTIBODY TARGET 1 IGM RESULT: NEGATIVE
PLATELET ANTIBODY TARGET 2 IGG RESULT: NEGATIVE
PLATELET ANTIBODY TARGET 2 IGM RESULT: NEGATIVE
SCAN RESULT: NORMAL

## 2024-01-07 LAB
ALBUMIN: 3.3 G/DL (ref 3.4–5)
ALPHA 1 GLOBULIN: 0.3 G/DL (ref 0.2–0.6)
ALPHA 2 GLOBULIN: 0.8 G/DL (ref 0.4–1.1)
BETA GLOBULIN: 1.2 G/DL (ref 0.5–1.2)
GAMMA GLOBULIN: 1.9 G/DL (ref 0.5–1.4)
PATH REVIEW-SERUM PROTEIN ELECTROPHORESIS: ABNORMAL
PROTEIN ELECTROPHORESIS COMMENT: ABNORMAL

## 2024-01-26 ENCOUNTER — APPOINTMENT (OUTPATIENT)
Dept: HEMATOLOGY/ONCOLOGY | Facility: HOSPITAL | Age: 64
End: 2024-01-26
Payer: COMMERCIAL

## 2024-01-26 ENCOUNTER — TELEPHONE (OUTPATIENT)
Dept: HEMATOLOGY/ONCOLOGY | Facility: HOSPITAL | Age: 64
End: 2024-01-26
Payer: COMMERCIAL

## 2024-01-26 NOTE — PROGRESS NOTES
Oncology Follow up Note     Date of Service: 01/26/24    Cancer History  Prostate Cancer - Stage IV   Sites of disease - bone , nodes  Treatment  - 11/2019 .7, 12/22/2019 98.84, prostate biopsy 2/2020 - GL 4+4=8, multiple cores,   -MRI of the prostate performed January 2020 oxana mets,   2/2020 - urethral stricture status post urethroplasty, Bone scan 2/2020 - focal abnormality in the right ischium highly concerning for metastatic disease,   -ADT 3/2020 /   -Apalutamide/Erleada 4/2020 - rad onc hold on XRT due to pandemic, dose modified 9/28/2020 2 tabs  -Admission 10/2020 - persistent difficulty with hypertensive crisis and noncompliance, stable echo, discharged on amlodipine, hydrochlorothiazide, hydralazine, clonidine  -Modified dose of 2 tabs 11/2020- yet unclear if compliant, due to cost  -Admission 12/24/20 - Kidney stones, CRISTIN, UTI, elevated BP  -Lost to follow up non compliance and did get one dose of ADT 11/21 last dose 3 m and refused XRT  -Admission 11/30/22 / Kidney stones / abd pain / CRISTIN / s/p cysto / removal of stones / left stent/ uncontrolled HTN, Admission 12/22  C diff / f/u with urology  / repeat work up no e/o mets oxana disease and primary disease, PET PSMA 2/23, no clear mets in bone, see below  3/9/2023: Starts ADT- 3m Trelstar  , Radiation to Prostate and node 5/23/23  Non compliance and lost to follow up    Provider Team  No ref. provider found   MD Braulio Ordazha Theodore - cardiology  Dr Leslie   pt phone   Asuncion Sierra    Other contributing history  HTN, Arthritis, Urethral stricutre, cysto negative, SP catheter, urethroplasty 2/18/2020, plate fabian , CKD, Afibr on eliquis . pancytopenia   CT 1/23 - renal cysts not enhanced hard to eval, node near pancreas 1.8x2.8 cm, PET PSMA- 2/23 -increased uptake in the prostate  cancer and external iliac lymph node nonspecific subcutaneous nodule in the left lower abdomen no uptake in the  interpolar kidney cyst, no other uptake seen including  body near the pancreas,  MRI pancreas 4/23  - mildly enlarged perihepatic LN, 1.9cm, non sepcific     Interval history:  The patient presents for follow up.  The patient denies any significant ongoing issues or worsening nausea, vomiting, fevers, chills, easy bruising or bleeding chest pain melena shortness of breath diarrhea or worsening fatigue.    ROS:  10-pt ROS reviewed and negative except as mentioned above.    Medications: below was reviewed and is accurate  Current Outpatient Medications   Medication Instructions    amLODIPine (Norvasc) 10 mg tablet TAKE 1 TABLET BY MOUTH EVERY DAY *PATIENT NEEDS APPOINTMENT*    carvedilol (COREG) 25 mg, oral, 2 times daily with meals    chlorthalidone (Hygroton) 25 mg tablet     Eliquis 5 mg, oral, 2 times daily    gabapentin (NEURONTIN) 100 mg, oral, Every 6 hours during day    hydrALAZINE (APRESOLINE) 10 mg, oral, 3 times daily    methylPREDNISolone (Medrol Dospak) 4 mg tablets Take as directed on package.        Detailed family history and social history reviewed in detail and updated per epic charting and in detail with the patient    Physical exam:  There were no vitals filed for this visit.    GEN: NAD, well-appearing  HEENT: no clear lesions seen  NECK: no clear masses  LYMPH: no palpable adenopathy  SKIN: no concerning new lesions  LUNGS: CTA  CV: RRR no clear R/G  ABD: Soft, NTND. No rebound or guarding.  EXT:  trace edema bilaterally   NEURO: Grossly intact. No focal deficits.  PSYCH: Normal mood and affect    Radiology review  Reviewed in detail personally and for detail see results in EPIC        Genomics Data    Laboratory review  Reviewed in detail personally and for detail see results in [facility]  Lab Results   Component Value Date    WBC 4.7 01/03/2024    WBC 3.6 (L) 06/23/2023    WBC 4.0 (L) 06/19/2023     Lab Results   Component Value Date    HGB 12.5 (L) 01/03/2024    HGB 11.9 (L) 06/23/2023    HGB 12.2  (L) 06/19/2023     Lab Results   Component Value Date     01/03/2024     (L) 06/23/2023     (L) 06/19/2023     Lab Results   Component Value Date    GLUCOSE 91 01/03/2024    CALCIUM 9.4 01/03/2024     01/03/2024    K 3.5 01/03/2024    CO2 25 01/03/2024     01/03/2024    BUN 26 (H) 01/03/2024    CREATININE 1.97 (H) 01/03/2024     Lab Results   Component Value Date    PSA 0.91 06/23/2023    PSA 1.54 06/19/2023    PSA 7.39 (H) 04/04/2023     Lab Results   Component Value Date    ALT 8 (L) 01/03/2024    AST 13 01/03/2024    ALKPHOS 85 01/03/2024    BILITOT 0.5 01/03/2024     Lab Results   Component Value Date    TESTOSTERONE <60 (L) 06/23/2023     ASSESSMENT AND PLAN     Prostate Cancer -   Pancytopenia  CKD  Pancreas findings    Elder Perez MD  Senior Attending Physician/  in Medicine [employer]  [facility]  Patient line [phone]  Fax [phone]

## 2024-02-02 ENCOUNTER — TELEPHONE (OUTPATIENT)
Dept: HEMATOLOGY/ONCOLOGY | Facility: HOSPITAL | Age: 64
End: 2024-02-02
Payer: COMMERCIAL

## 2024-02-07 ENCOUNTER — OFFICE VISIT (OUTPATIENT)
Dept: PRIMARY CARE | Facility: CLINIC | Age: 64
End: 2024-02-07
Payer: COMMERCIAL

## 2024-02-07 VITALS
SYSTOLIC BLOOD PRESSURE: 120 MMHG | OXYGEN SATURATION: 98 % | TEMPERATURE: 98.2 F | HEART RATE: 86 BPM | HEIGHT: 69 IN | BODY MASS INDEX: 48.73 KG/M2 | DIASTOLIC BLOOD PRESSURE: 84 MMHG

## 2024-02-07 DIAGNOSIS — N18.32 STAGE 3B CHRONIC KIDNEY DISEASE (MULTI): ICD-10-CM

## 2024-02-07 DIAGNOSIS — M77.01 MEDIAL EPICONDYLITIS OF ELBOW, RIGHT: Primary | ICD-10-CM

## 2024-02-07 PROBLEM — C61 PROSTATE CANCER (MULTI): Status: ACTIVE | Noted: 2024-02-07

## 2024-02-07 PROBLEM — G45.9 TIA (TRANSIENT ISCHEMIC ATTACK): Status: ACTIVE | Noted: 2019-12-20

## 2024-02-07 PROBLEM — N17.9 ACUTE RENAL FAILURE (CMS-HCC): Status: ACTIVE | Noted: 2020-10-05

## 2024-02-07 PROBLEM — N20.0 CALCULUS OF KIDNEY: Status: ACTIVE | Noted: 2024-02-07

## 2024-02-07 PROBLEM — N18.30 CKD (CHRONIC KIDNEY DISEASE), STAGE III (MULTI): Status: ACTIVE | Noted: 2024-02-07

## 2024-02-07 PROBLEM — R97.20 ELEVATED PSA: Status: ACTIVE | Noted: 2024-02-07

## 2024-02-07 PROBLEM — I10 ESSENTIAL HYPERTENSION: Status: ACTIVE | Noted: 2024-02-07

## 2024-02-07 PROBLEM — I50.30 (HFPEF) HEART FAILURE WITH PRESERVED EJECTION FRACTION (MULTI): Status: ACTIVE | Noted: 2024-02-07

## 2024-02-07 PROBLEM — I48.91 ATRIAL FIBRILLATION (MULTI): Status: ACTIVE | Noted: 2024-02-07

## 2024-02-07 PROBLEM — E11.9 DIABETES MELLITUS, CONTROLLED (MULTI): Status: ACTIVE | Noted: 2024-02-07

## 2024-02-07 PROCEDURE — 3044F HG A1C LEVEL LT 7.0%: CPT | Performed by: FAMILY MEDICINE

## 2024-02-07 PROCEDURE — 3008F BODY MASS INDEX DOCD: CPT | Performed by: FAMILY MEDICINE

## 2024-02-07 PROCEDURE — 3074F SYST BP LT 130 MM HG: CPT | Performed by: FAMILY MEDICINE

## 2024-02-07 PROCEDURE — 3048F LDL-C <100 MG/DL: CPT | Performed by: FAMILY MEDICINE

## 2024-02-07 PROCEDURE — 3079F DIAST BP 80-89 MM HG: CPT | Performed by: FAMILY MEDICINE

## 2024-02-07 PROCEDURE — 99213 OFFICE O/P EST LOW 20 MIN: CPT | Performed by: FAMILY MEDICINE

## 2024-02-07 PROCEDURE — 1036F TOBACCO NON-USER: CPT | Performed by: FAMILY MEDICINE

## 2024-02-07 ASSESSMENT — ENCOUNTER SYMPTOMS
DEPRESSION: 0
LOSS OF SENSATION IN FEET: 0
OCCASIONAL FEELINGS OF UNSTEADINESS: 0

## 2024-02-07 NOTE — PROGRESS NOTES
Subjective   Patient ID: Rei Alcocer is a 63 y.o. male who presents for Follow-up (Patient has a Lump On Right elbow That is Painful. Patient would Like To discuss kidneys).  Patient had complaints  of right elbow pain for couple days.  Had some swelling initially,  but down now.  Feels pain from elbow to lateral hand.   It is  worse with using the arm and hand.  Squeezing  hand  will shoot  pain up forearm  to elbow.  No  trauma.    Had a lot  blood  work done recently by  oncologist.  GFR 38.    Also history of gout.  Will gets flare of pain, mostly on right  side, foot frequently.  Has reduced red  meat.  When has gout attack, medrol helps    Doesn't sleep well, has  been for a long  time.  Goes to sleep 5 AM, until 3 PM.            Review of Systems    Objective   There were no vitals taken for this visit.    Physical Exam  Vitals reviewed.   Constitutional:       Appearance: Normal appearance.   Cardiovascular:      Rate and Rhythm: Normal rate and regular rhythm.      Heart sounds: No murmur heard.  Pulmonary:      Effort: Pulmonary effort is normal.      Breath sounds: Normal breath sounds.   Musculoskeletal:      Comments: Right upper extremity neurovascularly intact.  Tenderness at medial epicondyle with tenderness along medial forearm.  No swelling or  redness noted.  ROM is normal.   Neurological:      Mental Status: He is alert.           Assessment/Plan   Problem List Items Addressed This Visit    None

## 2024-02-08 NOTE — PATIENT INSTRUCTIONS
Pain  in right elbow to hand  is consistent with tendonitis.  Can get  tennis elbow band  at the drug store which can be helpful  Ice the area a couple times a  day for 15 minutes.  Follow up if worsening, additional symptoms.    GFR 38, indicates chronic kidney disease.  Controlling BP and glucose will help.  Also should avoid taking frequent anti-inflammatories, such as ibuprofen and naproxen (Advil, Motrin, Aleve).  Tylenol (acetaminophen) is OK.  Refer to nephrologist.    Recurrent gout  with elevated uric acid.  Will give info on dietary changes that can help.  There  are  also medications that can be used daily to bring uric acid down,  and reduce  gout  episodes.  Will hold off for now.

## 2024-02-15 NOTE — PROGRESS NOTES
Oncology Follow up Note     Date of Service: 02/16/24    Cancer History  Prostate Cancer - Stage IV   Sites of disease - bone , nodes  Treatment  - 11/2019 .7, 12/22/2019 98.84, prostate biopsy 2/2020 - GL 4+4=8, multiple cores,   -MRI of the prostate performed January 2020 oxana mets,   2/2020 - urethral stricture status post urethroplasty, Bone scan 2/2020 - focal abnormality in the right ischium highly concerning for metastatic disease,   -ADT 3/2020 /   -Apalutamide/Erleada 4/2020 - rad onc hold on XRT due to pandemic, dose modified 9/28/2020 2 tabs  -Admission 10/2020 - persistent difficulty with hypertensive crisis and noncompliance, stable echo, discharged on amlodipine, hydrochlorothiazide, hydralazine, clonidine  -Modified dose of 2 tabs 11/2020- yet unclear if compliant, due to cost  -Admission 12/24/20 - Kidney stones, CRISTIN, UTI, elevated BP  -Lost to follow up non compliance and did get one dose of ADT 11/21 last dose 3 m and refused XRT  -Admission 11/30/22 / Kidney stones / abd pain / CRISTIN / s/p cysto / removal of stones / left stent/ uncontrolled HTN, Admission 12/22  C diff / f/u with urology  / repeat work up no e/o mets oxana disease and primary disease (PET PSMA 2/23 - see below no clear bony met)  3/9/2023: Starts ADT- 3m Trelstar  , Radiation to Prostate and node 5/23/23, last ADT 6/23 3m dose, non compliance and refused follow up    Provider Team  No ref. provider found   MD Vlad Ordaz - cardiology  Dr Leslie   Wife - [phone]  Asuncion Estevez / Nayeli Masters    Other contributing history  HTN, Arthritis, Urethral stricutre, cysto negative, SP catheter, urethroplasty 2/18/2020, plate fabian , CKD, Afibr on eliquis   CT 1/23 - renal cysts not enhanced hard to eval, node near pancreas 1.8x2.8 cm, PET PSMA- 2/23 -increased uptake in the prostate  cancer and external iliac lymph node nonspecific subcutaneous nodule in the left lower abdomen no uptake  "in the interpolar kidney cyst, no other uptake seen including  body near the pancreas,  MRI pancreas 4/23  - mildly enlarged perihepatic LN, 1.9cm, non sepcific    Interval history:  The patient presents for follow up.  The patient is accompanied by his wife.  Unclear why he was lost to follow-up.  He has been working aggressively to get his blood pressure control and notes that his blood pressure is overall been improved and stable.  Still some occasional issues with hot flashes and fatigue but denies any worsening urinary symptoms.  Denies any nausea, vomiting, fevers, chills, easy bruising or bleeding denies any chest pain or chest tightness.    ROS:  10-pt ROS reviewed and negative except as mentioned above.    Medications: below was reviewed and is accurate  Current Outpatient Medications   Medication Instructions    amLODIPine (Norvasc) 10 mg tablet TAKE 1 TABLET BY MOUTH EVERY DAY *PATIENT NEEDS APPOINTMENT*    carvedilol (COREG) 25 mg, oral, 2 times daily with meals    chlorthalidone (Hygroton) 25 mg tablet     Eliquis 5 mg, oral, 2 times daily    gabapentin (NEURONTIN) 100 mg, oral, Every 6 hours during day    hydrALAZINE (APRESOLINE) 10 mg, oral, 3 times daily        Detailed family history and social history reviewed in detail and updated per epic charting and in detail with the patient    Physical exam:  Vitals:    02/16/24 1310   BP: (!) 161/99   BP Location: Left arm   Patient Position: Sitting   BP Cuff Size: Large adult   Pulse: 59   Resp: 22   Temp: 35.7 °C (96.3 °F)   TempSrc: Temporal   SpO2: 98%   Weight: (!) 155 kg (341 lb 11.4 oz)   Height: (S) 1.765 m (5' 9.49\")       GEN: NAD, well-appearing  HEENT: no clear lesions seen  NECK: no clear masses  LYMPH: no palpable adenopathy  SKIN: no concerning new lesions  LUNGS: CTA  CV: RRR no clear R/G  ABD: Soft, NTND. No rebound or guarding.  EXT:  trace edema bilaterally   NEURO: Grossly intact. No focal deficits.  PSYCH: Normal mood and " affect    Radiology review  Reviewed in detail personally and for detail see results in EPIC        Genomics Data    Laboratory review  Reviewed in detail personally and for detail see results in EPIC  Lab Results   Component Value Date    WBC 4.7 01/03/2024    WBC 3.6 (L) 06/23/2023    WBC 4.0 (L) 06/19/2023     Lab Results   Component Value Date    HGB 12.5 (L) 01/03/2024    HGB 11.9 (L) 06/23/2023    HGB 12.2 (L) 06/19/2023     Lab Results   Component Value Date     01/03/2024     (L) 06/23/2023     (L) 06/19/2023     Lab Results   Component Value Date    GLUCOSE 91 01/03/2024    CALCIUM 9.4 01/03/2024     01/03/2024    K 3.5 01/03/2024    CO2 25 01/03/2024     01/03/2024    BUN 26 (H) 01/03/2024    CREATININE 1.97 (H) 01/03/2024     Lab Results   Component Value Date    PSA 0.91 06/23/2023    PSA 1.54 06/19/2023    PSA 7.39 (H) 04/04/2023     Lab Results   Component Value Date    ALT 8 (L) 01/03/2024    AST 13 01/03/2024    ALKPHOS 85 01/03/2024    BILITOT 0.5 01/03/2024     Lab Results   Component Value Date    TESTOSTERONE <60 (L) 06/23/2023       ASSESSMENT AND PLAN   Prostate Cancer -again the patient has been clear about not taking more ADT and was lost to follow-up and only got roughly 6 months of ADT with radiation discussed the usual goal would be to a year or up to 2 years of treatment which he had refused.  For now he is also hesitant to restart.  We will go ahead and get a repeat PSA and testosterone and contact him with the results.  Explained the need to stay with compliance if overall stable follow-up in 3 months with labs and PSA and testosterone prior and he will contact us for any other new complaints.  Pancytopenia-continue follow-up with hematology  Pancreas Findings-discussed need for follow-up and he was agreeable will arrange for MRI with and without contrast creatinine ordered for today  HTN-continues to remain poorly controlled recommend close monitoring  follow-up with his primary care physician  CKD -referral placed to nephrology team.    discussed need while on ADT  maintain adequate cardiovascular health, exercise, dietary modifications,  bone health with calcium 1000 mg with vitamin D 400-800 international units      Elder Perez MD  Senior Attending Physician/  in Medicine [employer]  [facility] / [facility]  Patient line [phone]  Fax [phone]

## 2024-02-16 ENCOUNTER — LAB (OUTPATIENT)
Dept: LAB | Facility: HOSPITAL | Age: 64
End: 2024-02-16
Payer: COMMERCIAL

## 2024-02-16 ENCOUNTER — OFFICE VISIT (OUTPATIENT)
Dept: HEMATOLOGY/ONCOLOGY | Facility: HOSPITAL | Age: 64
End: 2024-02-16
Payer: COMMERCIAL

## 2024-02-16 VITALS
SYSTOLIC BLOOD PRESSURE: 161 MMHG | BODY MASS INDEX: 46.65 KG/M2 | DIASTOLIC BLOOD PRESSURE: 99 MMHG | HEART RATE: 59 BPM | TEMPERATURE: 96.3 F | RESPIRATION RATE: 22 BRPM | HEIGHT: 69 IN | OXYGEN SATURATION: 98 % | WEIGHT: 315 LBS

## 2024-02-16 DIAGNOSIS — K86.89 MASS OF PANCREAS (HHS-HCC): ICD-10-CM

## 2024-02-16 DIAGNOSIS — D61.818 PANCYTOPENIA (MULTI): ICD-10-CM

## 2024-02-16 DIAGNOSIS — C61 PROSTATE CANCER (MULTI): ICD-10-CM

## 2024-02-16 DIAGNOSIS — I10 PRIMARY HYPERTENSION: ICD-10-CM

## 2024-02-16 DIAGNOSIS — D61.818 PANCYTOPENIA (MULTI): Primary | ICD-10-CM

## 2024-02-16 LAB
ALBUMIN SERPL BCP-MCNC: 3.5 G/DL (ref 3.4–5)
ALP SERPL-CCNC: 79 U/L (ref 33–136)
ALT SERPL W P-5'-P-CCNC: 9 U/L (ref 10–52)
ANION GAP SERPL CALC-SCNC: 13 MMOL/L (ref 10–20)
AST SERPL W P-5'-P-CCNC: 13 U/L (ref 9–39)
BILIRUB SERPL-MCNC: 0.4 MG/DL (ref 0–1.2)
BUN SERPL-MCNC: 29 MG/DL (ref 6–23)
CALCIUM SERPL-MCNC: 9.3 MG/DL (ref 8.6–10.3)
CHLORIDE SERPL-SCNC: 102 MMOL/L (ref 98–107)
CO2 SERPL-SCNC: 29 MMOL/L (ref 21–32)
CREAT SERPL-MCNC: 2.18 MG/DL (ref 0.5–1.3)
EGFRCR SERPLBLD CKD-EPI 2021: 34 ML/MIN/1.73M*2
GLUCOSE SERPL-MCNC: 125 MG/DL (ref 74–99)
POTASSIUM SERPL-SCNC: 3.5 MMOL/L (ref 3.5–5.3)
PROT SERPL-MCNC: 8.2 G/DL (ref 6.4–8.2)
PSA SERPL-MCNC: 0.19 NG/ML
SODIUM SERPL-SCNC: 140 MMOL/L (ref 136–145)
TESTOST SERPL-MCNC: 142 NG/DL (ref 240–1000)
URATE SERPL-MCNC: 12.3 MG/DL (ref 4–7.5)

## 2024-02-16 PROCEDURE — 3080F DIAST BP >= 90 MM HG: CPT | Performed by: INTERNAL MEDICINE

## 2024-02-16 PROCEDURE — 3048F LDL-C <100 MG/DL: CPT | Performed by: INTERNAL MEDICINE

## 2024-02-16 PROCEDURE — 3077F SYST BP >= 140 MM HG: CPT | Performed by: INTERNAL MEDICINE

## 2024-02-16 PROCEDURE — 84403 ASSAY OF TOTAL TESTOSTERONE: CPT

## 2024-02-16 PROCEDURE — 99214 OFFICE O/P EST MOD 30 MIN: CPT | Performed by: INTERNAL MEDICINE

## 2024-02-16 PROCEDURE — 84550 ASSAY OF BLOOD/URIC ACID: CPT

## 2024-02-16 PROCEDURE — 1036F TOBACCO NON-USER: CPT | Performed by: INTERNAL MEDICINE

## 2024-02-16 PROCEDURE — 36415 COLL VENOUS BLD VENIPUNCTURE: CPT

## 2024-02-16 PROCEDURE — 84153 ASSAY OF PSA TOTAL: CPT

## 2024-02-16 PROCEDURE — 3044F HG A1C LEVEL LT 7.0%: CPT | Performed by: INTERNAL MEDICINE

## 2024-02-16 PROCEDURE — 84075 ASSAY ALKALINE PHOSPHATASE: CPT

## 2024-02-16 PROCEDURE — 3008F BODY MASS INDEX DOCD: CPT | Performed by: INTERNAL MEDICINE

## 2024-02-16 NOTE — PATIENT INSTRUCTIONS
Psa today will call with results  Referral to dr del real - nephrology team  Mri of pancreas  F/up in 3 months with labs prior

## 2024-02-17 ENCOUNTER — TELEPHONE (OUTPATIENT)
Dept: HEMATOLOGY/ONCOLOGY | Facility: CLINIC | Age: 64
End: 2024-02-17
Payer: COMMERCIAL

## 2024-02-18 NOTE — RESULT ENCOUNTER NOTE
Glucose 125.  Kidney function continues to  be reduced, and is a little less than last time.  See nephrologist  as planned.  Liver is normal.

## 2024-02-21 ENCOUNTER — TELEMEDICINE (OUTPATIENT)
Dept: HEMATOLOGY/ONCOLOGY | Facility: HOSPITAL | Age: 64
End: 2024-02-21
Payer: COMMERCIAL

## 2024-02-21 DIAGNOSIS — D61.818 PANCYTOPENIA (MULTI): ICD-10-CM

## 2024-02-21 DIAGNOSIS — E79.0 HYPERURICEMIA: Primary | ICD-10-CM

## 2024-02-21 PROCEDURE — 1036F TOBACCO NON-USER: CPT | Performed by: INTERNAL MEDICINE

## 2024-02-21 PROCEDURE — 3044F HG A1C LEVEL LT 7.0%: CPT | Performed by: INTERNAL MEDICINE

## 2024-02-21 PROCEDURE — 3008F BODY MASS INDEX DOCD: CPT | Performed by: INTERNAL MEDICINE

## 2024-02-21 PROCEDURE — 3048F LDL-C <100 MG/DL: CPT | Performed by: INTERNAL MEDICINE

## 2024-02-21 PROCEDURE — 99214 OFFICE O/P EST MOD 30 MIN: CPT | Mod: 95 | Performed by: INTERNAL MEDICINE

## 2024-02-21 PROCEDURE — 99214 OFFICE O/P EST MOD 30 MIN: CPT | Performed by: INTERNAL MEDICINE

## 2024-02-21 RX ORDER — COLCHICINE 0.6 MG/1
0.6 TABLET ORAL DAILY
Qty: 14 TABLET | Refills: 0 | Status: SHIPPED | OUTPATIENT
Start: 2024-02-22 | End: 2024-03-07

## 2024-02-21 RX ORDER — ALLOPURINOL 100 MG/1
100 TABLET ORAL DAILY
Qty: 90 TABLET | Refills: 3 | Status: SHIPPED | OUTPATIENT
Start: 2024-03-01 | End: 2025-03-01

## 2024-02-21 NOTE — PROGRESS NOTES
Patient ID: Rei Alcocer is a 63 y.o. male.  Referring Physician: Vlad Sierra MD  [id number] [id number]  Primary Care Provider: Erin Leslie MD  Visit Type:  Follow Up     Verbal consent was requested and obtained from patient on this date for a telehealth visit.    Subjective    HPI  This visit is a televisit at the patient's request.  It follow-up to the patient's 1/3/24 visit.   Mr Alcocer is a 62 yo M who is referred to Hematology clinic for pancytopenia evaluation.      Patient comes in accompanied by his wife, Mile. He has no major complaints today. Feels well overall. Denies any fevers, chills, night sweats, weight loss, new cp, sob, new lumps, bone pain, bleeding, recent infections or new medications. He completed radiation to prostate and nodes during summer. He is on ADT (he believed last shot September) which gives him hot flashes, low energy and weight gain. He is very hypertensive today 216/92 and reports at home it has been similar. He has taken all his anti-HTN meds this AM. He is currently not established with Nephrology or Cardiology.      PMH:   Prostate Cancer - Stage IV   Primary Oncologist: Dr Perez  Radiation Oncologist: Dr Masters     Sites of disease - bone (?), nodes     Treatment Hx:   Prostate adenocarcinoma diagnosed in 02/2020, GS 4+4, iPSA 107, cT3b by MR, N1, uptake on bone scan in right ischium without  CT or MR correlate of bone lesion.      He was started on ADT in 02/2020, apalutamide was then added with unclear duration of treatment as patient d/c'd med by himself.  Treatment course has been complicated by hypertensive crisis, and more recently admission  for kidney stones status post placement of ureteral stent. His last dose of ADT was 11/2021, and he is not been on any therapy in this past year.      1/24/2020 MRI prostate noted diffusely infiltrative mass involving the entire prostate with invasion bilateral seminal vesicles, and large metastatic  iliac lymph nodes. His 2/5/2020 bone scan did note focal abnormality in the right ischium, without anatomic  correlation on the 12/21/19 diagnostic CT nor the 1/24/2020 MRI.      2/16/2023 PSMA PET redemonstrated avidity in prostate and seminal vesicle, right external iliac node, without evidence of distant oxana, bone, or visceral metastasis.     3/9/2023: Starts ADT- 3m Trelstar and Radiation to Prostate and node 5/23/23 - 6/26/2023 (daily treatments over 20 fractions)     Other medical conditions: HTN, CAD, Depression, HFpEF, A-fib (on Eliquis), CKD, Kidney stones and urethral stricture s/p urethroplasty and stent     PSH: Urethroplasty, ankle surgery for fracture     Family history: father with prostate cancer, mother with lung cancer, paternal uncle with prostate cancer. No fam hx of anemia.     Social history: Retired . Never smoker, denies alcohol or recreational drug use. Marries, no children.     Review of Systems - Oncology     Objective   BSA: There is no height or weight on file to calculate BSA.  There were no vitals taken for this visit.     has no past medical history on file.   has a past surgical history that includes Other surgical history (10/23/2020); Other surgical history (10/23/2020); and IR aspiration of bladder by needle insertion of suprapubic catheter (12/23/2019).  No family history on file.  Oncology History    No history exists.       Rei Alcocer  reports that he has never smoked. He has never used smokeless tobacco.  He  reports that he does not currently use alcohol.  He  reports no history of drug use.    Physical Exam    WBC   Date/Time Value Ref Range Status   01/03/2024 12:18 PM 4.7 4.4 - 11.3 x10*3/uL Final   06/23/2023 02:57 PM 3.6 (L) 4.4 - 11.3 x10E9/L Final   06/19/2023 11:50 AM 4.0 (L) 4.4 - 11.3 x10E9/L Final   04/04/2023 01:50 PM 5.7 4.4 - 11.3 x10E9/L Final     nRBC   Date Value Ref Range Status   01/03/2024 0.0 0.0 - 0.0 /100 WBCs Final   12/30/2022 0.0  "0.0 - 0.0 /100 WBC Final   12/28/2022 0.0 0.0 - 0.0 /100 WBC Final   12/27/2022 0.0 0.0 - 0.0 /100 WBC Final     RBC   Date Value Ref Range Status   01/03/2024 4.73 4.50 - 5.90 x10*6/uL Final   06/23/2023 4.47 (L) 4.50 - 5.90 x10E12/L Final   06/19/2023 4.65 4.50 - 5.90 x10E12/L Final   04/04/2023 4.84 4.50 - 5.90 x10E12/L Final     Hemoglobin   Date Value Ref Range Status   01/03/2024 12.5 (L) 13.5 - 17.5 g/dL Final   06/23/2023 11.9 (L) 13.5 - 17.5 g/dL Final   06/19/2023 12.2 (L) 13.5 - 17.5 g/dL Final   04/04/2023 12.7 (L) 13.5 - 17.5 g/dL Final     Hematocrit   Date Value Ref Range Status   01/03/2024 38.9 (L) 41.0 - 52.0 % Final   06/23/2023 36.7 (L) 41.0 - 52.0 % Final   06/19/2023 38.6 (L) 41.0 - 52.0 % Final   04/04/2023 40.4 (L) 41.0 - 52.0 % Final     MCV   Date/Time Value Ref Range Status   01/03/2024 12:18 PM 82 80 - 100 fL Final   06/23/2023 02:57 PM 82 80 - 100 fL Final   06/19/2023 11:50 AM 83 80 - 100 fL Final   04/04/2023 01:50 PM 83 80 - 100 fL Final     MCH   Date/Time Value Ref Range Status   01/03/2024 12:18 PM 26.4 26.0 - 34.0 pg Final     MCHC   Date/Time Value Ref Range Status   01/03/2024 12:18 PM 32.1 32.0 - 36.0 g/dL Final   06/23/2023 02:57 PM 32.4 32.0 - 36.0 g/dL Final   06/19/2023 11:50 AM 31.6 (L) 32.0 - 36.0 g/dL Final   04/04/2023 01:50 PM 31.4 (L) 32.0 - 36.0 g/dL Final     RDW   Date/Time Value Ref Range Status   01/03/2024 12:18 PM 15.1 (H) 11.5 - 14.5 % Final   06/23/2023 02:57 PM 14.4 11.5 - 14.5 % Final   06/19/2023 11:50 AM 14.4 11.5 - 14.5 % Final   04/04/2023 01:50 PM 15.4 (H) 11.5 - 14.5 % Final     Platelets   Date/Time Value Ref Range Status   01/03/2024 12:18  150 - 450 x10*3/uL Final   06/23/2023 02:57  (L) 150 - 450 x10E9/L Final   06/19/2023 11:50  (L) 150 - 450 x10E9/L Final   04/04/2023 01:50  150 - 450 x10E9/L Final     No results found for: \"MPV\"  Neutrophils %   Date/Time Value Ref Range Status   01/03/2024 12:18 PM 77.3 40.0 - 80.0 % " Final   06/23/2023 02:57 PM 75.2 40.0 - 80.0 % Final   06/19/2023 11:50 AM 81.6 40.0 - 80.0 % Final   04/04/2023 01:50 PM 65.6 40.0 - 80.0 % Final     Immature Granulocytes %, Automated   Date/Time Value Ref Range Status   01/03/2024 12:18 PM 0.2 0.0 - 0.9 % Final     Comment:     Immature Granulocyte Count (IG) includes promyelocytes, myelocytes and metamyelocytes but does not include bands. Percent differential counts (%) should be interpreted in the context of the absolute cell counts (cells/UL).   06/23/2023 02:57 PM 0.3 0.0 - 0.9 % Final     Comment:      Immature Granulocyte Count (IG) includes promyelocytes,    myelocytes and metamyelocytes but does not include bands.   Percent differential counts (%) should be interpreted in the   context of the absolute cell counts (cells/L).     06/19/2023 11:50 AM 0.5 0.0 - 0.9 % Final     Comment:      Immature Granulocyte Count (IG) includes promyelocytes,    myelocytes and metamyelocytes but does not include bands.   Percent differential counts (%) should be interpreted in the   context of the absolute cell counts (cells/L).     04/04/2023 01:50 PM 0.4 0.0 - 0.9 % Final     Comment:      Immature Granulocyte Count (IG) includes promyelocytes,    myelocytes and metamyelocytes but does not include bands.   Percent differential counts (%) should be interpreted in the   context of the absolute cell counts (cells/L).       Lymphocytes %   Date/Time Value Ref Range Status   01/03/2024 12:18 PM 10.5 13.0 - 44.0 % Final   06/23/2023 02:57 PM 4.7 13.0 - 44.0 % Final   06/19/2023 11:50 AM 4.7 13.0 - 44.0 % Final   04/04/2023 01:50 PM 22.6 13.0 - 44.0 % Final     Monocytes %   Date/Time Value Ref Range Status   01/03/2024 12:18 PM 8.4 2.0 - 10.0 % Final   06/23/2023 02:57 PM 15.3 2.0 - 10.0 % Final   06/19/2023 11:50 AM 9.7 2.0 - 10.0 % Final   04/04/2023 01:50 PM 8.8 2.0 - 10.0 % Final     Eosinophils %   Date/Time Value Ref Range Status   01/03/2024 12:18 PM 3.2 0.0 - 6.0 %  Final   06/23/2023 02:57 PM 3.9 0.0 - 6.0 % Final   06/19/2023 11:50 AM 3.0 0.0 - 6.0 % Final   04/04/2023 01:50 PM 2.1 0.0 - 6.0 % Final     Basophils %   Date/Time Value Ref Range Status   01/03/2024 12:18 PM 0.4 0.0 - 2.0 % Final   06/23/2023 02:57 PM 0.6 0.0 - 2.0 % Final   06/19/2023 11:50 AM 0.5 0.0 - 2.0 % Final   04/04/2023 01:50 PM 0.5 0.0 - 2.0 % Final     Neutrophils Absolute   Date/Time Value Ref Range Status   01/03/2024 12:18 PM 3.60 1.20 - 7.70 x10*3/uL Final     Comment:     Percent differential counts (%) should be interpreted in the context of the absolute cell counts (cells/uL).   06/23/2023 02:57 PM 2.70 1.20 - 7.70 x10E9/L Final   06/19/2023 11:50 AM 3.29 1.20 - 7.70 x10E9/L Final   04/04/2023 01:50 PM 3.75 1.20 - 7.70 x10E9/L Final     Immature Granulocytes Absolute, Automated   Date/Time Value Ref Range Status   01/03/2024 12:18 PM 0.01 0.00 - 0.70 x10*3/uL Final     Lymphocytes Absolute   Date/Time Value Ref Range Status   01/03/2024 12:18 PM 0.49 (L) 1.20 - 4.80 x10*3/uL Final   06/23/2023 02:57 PM 0.17 (L) 1.20 - 4.80 x10E9/L Final   06/19/2023 11:50 AM 0.19 (L) 1.20 - 4.80 x10E9/L Final   04/04/2023 01:50 PM 1.29 1.20 - 4.80 x10E9/L Final     Monocytes Absolute   Date/Time Value Ref Range Status   01/03/2024 12:18 PM 0.39 0.10 - 1.00 x10*3/uL Final   06/23/2023 02:57 PM 0.55 0.10 - 1.00 x10E9/L Final   06/19/2023 11:50 AM 0.39 0.10 - 1.00 x10E9/L Final   04/04/2023 01:50 PM 0.50 0.10 - 1.00 x10E9/L Final     Eosinophils Absolute   Date/Time Value Ref Range Status   01/03/2024 12:18 PM 0.15 0.00 - 0.70 x10*3/uL Final   06/23/2023 02:57 PM 0.14 0.00 - 0.70 x10E9/L Final   06/19/2023 11:50 AM 0.12 0.00 - 0.70 x10E9/L Final   04/04/2023 01:50 PM 0.12 0.00 - 0.70 x10E9/L Final     Basophils Absolute   Date/Time Value Ref Range Status   01/03/2024 12:18 PM 0.02 0.00 - 0.10 x10*3/uL Final   06/23/2023 02:57 PM 0.02 0.00 - 0.10 x10E9/L Final   06/19/2023 11:50 AM 0.02 0.00 - 0.10 x10E9/L Final  "  04/04/2023 01:50 PM 0.03 0.00 - 0.10 x10E9/L Final       No components found for: \"PT\"  aPTT   Date/Time Value Ref Range Status   01/03/2024 12:18 PM 59 (H) 27 - 38 seconds Final   10/06/2020 06:23 AM 44 (H) 25 - 35 sec Final     Comment:       THE APTT IS NO LONGER USED FOR MONITORING     UNFRACTIONATED HEPARIN THERAPY.    FOR MONITORING HEPARIN THERAPY,     USE THE HEPARIN ASSAY.     12/19/2019 01:59 PM 40 (H) 28 - 38 sec Final     Comment:       THE APTT IS NO LONGER USED FOR MONITORING     UNFRACTIONATED HEPARIN THERAPY.    FOR MONITORING HEPARIN THERAPY,     USE THE HEPARIN ASSAY.     04/06/2019 05:47 AM 43 (H) 28 - 38 sec Final     Comment:       THE APTT IS NO LONGER USED FOR MONITORING     UNFRACTIONATED HEPARIN THERAPY.    FOR MONITORING HEPARIN THERAPY,     USE THE HEPARIN ASSAY.       Labs.  Not so pancytopenic.  QXX=8249, Hgb 12.5; PCV=38.9, plts=160.  However, his BUN=29, creatinine=1.97-2.18, GFR=34 - Stage 3 CRF.  Ferritin 195; uric acid = 12.3    Assessment/Plan    Mr Alcocer is a 64 yo M PMH of metastatic prostate cancer on ADT and recent completion of radiation, who is referred to Hematology clinic for pancytopenia evaluation.      He is mostly asymptomatic. Labs show new mild leucopenia and thrombocytopenia in 2 occasions in June. Anemia longstanding and mild with Hgb 12; most likely from CKD. No labs since then.  Plt and WBC have improved since referral.  Long aPTT could be due to Eliquis; PT=normal.      Differential is broad at this point and requires additional wup. One important aspect to consider is his recent pelvic radiation which can affect directly the bone marrow.      One other concern is his uncontrolled hypertension. Today /92 despite taking all anti-HTN meds this AM. Encouraged to fup HTN management with PCP and consider seeing Cardiology and/or Nephrology. Recommended to reduce salt intake and start physical activity.      I do not need to see him for his hematology.  It is " OK.  He does has Stage 3 CRF and will be seeing a nephrologist.  I did not that he has a uric acid of 10.3 and 12.3.  He has a Hx of Gout.  I will start him on colcichine 0.6 mg po daily for 1 week and after 1 week he will add allopurinol 100 mg po daily. At the end of the 2nd week,th colchicine will stop and he will continue on the allopurinol.  I will get a Comprehensive and Uric acid on 3/8/24, as given the directions below.      Diagnoses and all orders for this visit:  Hyperuricemia  -     colchicine 0.6 mg tablet; Take 1 tablet (0.6 mg) by mouth once daily for 14 days. Do not start before February 22, 2024.  -     allopurinol (Zyloprim) 100 mg tablet; Take 1 tablet (100 mg) by mouth once daily. Do not start before March 1, 2024.  -     Comprehensive Metabolic Panel; Future  -     Uric Acid; Future  Pancytopenia (CMS/HCC)  -     Clinic Appointment Request Follow Up; [facility]  -     Clinic Appointment Request Follow Up; [facility]; Future     RTC in 3 months.     Kalyan Sierra MD

## 2024-02-22 DIAGNOSIS — N18.4 CHRONIC KIDNEY DISEASE, STAGE 4 (SEVERE) (MULTI): ICD-10-CM

## 2024-02-22 RX ORDER — CHLORTHALIDONE 25 MG/1
25 TABLET ORAL DAILY
Qty: 90 TABLET | Refills: 1 | Status: SHIPPED | OUTPATIENT
Start: 2024-02-22

## 2024-03-01 ENCOUNTER — HOSPITAL ENCOUNTER (OUTPATIENT)
Dept: RADIOLOGY | Facility: CLINIC | Age: 64
Discharge: HOME | End: 2024-03-01
Payer: COMMERCIAL

## 2024-03-01 ENCOUNTER — APPOINTMENT (OUTPATIENT)
Dept: RADIOLOGY | Facility: CLINIC | Age: 64
End: 2024-03-01
Payer: COMMERCIAL

## 2024-03-01 DIAGNOSIS — C61 PROSTATE CANCER (MULTI): ICD-10-CM

## 2024-03-01 DIAGNOSIS — K86.89 MASS OF PANCREAS (HHS-HCC): ICD-10-CM

## 2024-03-01 DIAGNOSIS — D61.818 PANCYTOPENIA (MULTI): ICD-10-CM

## 2024-03-08 DIAGNOSIS — I10 ESSENTIAL (PRIMARY) HYPERTENSION: ICD-10-CM

## 2024-03-08 RX ORDER — HYDRALAZINE HYDROCHLORIDE 10 MG/1
10 TABLET, FILM COATED ORAL 3 TIMES DAILY
Qty: 270 TABLET | Refills: 1 | Status: SHIPPED | OUTPATIENT
Start: 2024-03-08

## 2024-03-16 ENCOUNTER — HOSPITAL ENCOUNTER (OUTPATIENT)
Dept: RADIOLOGY | Facility: HOSPITAL | Age: 64
Discharge: HOME | End: 2024-03-16
Payer: COMMERCIAL

## 2024-03-16 PROCEDURE — 2550000001 HC RX 255 CONTRASTS: Performed by: INTERNAL MEDICINE

## 2024-03-16 PROCEDURE — A9575 INJ GADOTERATE MEGLUMI 0.1ML: HCPCS | Performed by: INTERNAL MEDICINE

## 2024-03-16 PROCEDURE — 74183 MRI ABD W/O CNTR FLWD CNTR: CPT

## 2024-03-16 RX ORDER — GADOTERATE MEGLUMINE 376.9 MG/ML
15 INJECTION INTRAVENOUS
Status: COMPLETED | OUTPATIENT
Start: 2024-03-16 | End: 2024-03-16

## 2024-03-16 RX ADMIN — GADOTERATE MEGLUMINE 30 ML: 376.9 INJECTION INTRAVENOUS at 10:03

## 2024-03-19 ENCOUNTER — TELEPHONE (OUTPATIENT)
Dept: HEMATOLOGY/ONCOLOGY | Facility: CLINIC | Age: 64
End: 2024-03-19
Payer: COMMERCIAL

## 2024-03-19 NOTE — TELEPHONE ENCOUNTER
"Subjective   Reason for Visit: Denys Denton is an 72 y.o. male here for a Medicare Wellness visit / follow up    Past Medical, Surgical, and Family History reviewed and updated in chart.    Reviewed all medications by prescribing practitioner or clinical pharmacist (such as prescriptions, OTCs, herbal therapies and supplements) and documented in the medical record.    HPI    Medicare wellness    Prostate n/a  Colonoscopy due 2026  CT chest 7-22 ok  immunizations rev'd  BMI 22.5    Follow up    H/o stroke  exercises, aspirin  Neuro and cardio  follow up  EKG ok     hypothyroidism on rx no side effects     s/p symptomatic anemia 2ry to AVM bleed  no bleeding seen  was using a lot of NSAID's for arthritic pains  Check CBC today     GERD stable on rx no side effects     CAD stable on rx no side effects     hypertension on rx no side effects     hypercholesterolemia on rx no side effects     h/o strokes 2013     nicotine cessation discussed  smoking and alcohol  cessation discussed   I spent more  than 3 min but less than 10 min counselling pt about need for smoking / tobacco cessation  and how I can support efforts when pt is ready to quit      diet / exercise rev'd     follow up cardio / Dr Chapin     follow weights    Patient Care Team:  Queenie Garza MD as PCP - General  Queenie Garza MD as PCP - [employer] PCP     Review of Systems   All other systems reviewed and are negative.      Objective   Vitals:  /68   Pulse 73   Ht 1.6 m (5' 3\")   Wt 57.6 kg (127 lb)   SpO2 97%   BMI 22.50 kg/m²       Physical Exam  Vitals reviewed.   Constitutional:       Appearance: Normal appearance. He is normal weight.   HENT:      Head: Normocephalic and atraumatic.      Mouth/Throat:      Pharynx: No posterior oropharyngeal erythema.   Eyes:      General: No scleral icterus.     Conjunctiva/sclera: Conjunctivae normal.      Pupils: Pupils are equal, round, and reactive to light. " Attempted to call both numbers and they were both in active sent a [employer] message to discuss MRI findings decreased size of peripancreatic lymph node cholelithiasis circumferential thickening nonspecific findings in the colon calling to see if symptoms instructed to call back through [employer] message.     Cardiovascular:      Rate and Rhythm: Normal rate and regular rhythm.      Heart sounds: Normal heart sounds.   Pulmonary:      Effort: No respiratory distress.      Breath sounds: No wheezing.   Abdominal:      General: Abdomen is flat. Bowel sounds are normal. There is no distension.      Palpations: Abdomen is soft. There is no mass.      Tenderness: There is no abdominal tenderness. There is no rebound.   Musculoskeletal:         General: Normal range of motion.      Cervical back: Normal range of motion and neck supple.   Skin:     General: Skin is warm and dry.   Neurological:      General: No focal deficit present.      Mental Status: He is alert and oriented to person, place, and time. Mental status is at baseline.   Psychiatric:         Mood and Affect: Mood normal.         Behavior: Behavior normal.         Thought Content: Thought content normal.         Judgment: Judgment normal.         Assessment/Plan   Problem List Items Addressed This Visit       Anemia    Relevant Orders    CBC and Auto Differential    Benign hypertension    CAD (coronary artery disease)    Gastroesophageal reflux disease    Hyperlipidemia    Relevant Medications    atorvastatin (Lipitor) 40 mg tablet    Hypothyroidism    Protein-calorie malnutrition, unspecified severity (CMS/[facility])    Chronic obstructive pulmonary disease, unspecified COPD type (CMS/[facility])    Stage 3 chronic kidney disease, unspecified whether stage 3a or 3b CKD (CMS/[facility])     Other Visit Diagnoses       Encounter for subsequent annual wellness visit (AWV) in Medicare patient    -  Primary    History of stroke        Nicotine dependence, uncomplicated, unspecified nicotine product type            Medicare wellness    Prostate n/a  Colonoscopy due 2026  CT chest 7-22 ok  immunizations rev'd  BMI 22.5    Follow up    H/o stroke  exercises, aspirin  Neuro and cardio  follow up  EKG ok     hypothyroidism on rx no side effects     s/p symptomatic anemia 2ry to AVM  bleed  no bleeding seen  was using a lot of NSAID's for arthritic pains  Check CBC today     GERD stable on rx no side effects     CAD stable on rx no side effects  follow up cardio / Dr Chapin    hypertension on rx no side effects     hypercholesterolemia on rx no side effects     h/o strokes 2013     nicotine cessation discussed  smoking and alcohol  cessation discussed   I spent more  than 3 min but less than 10 min counselling pt about need for smoking / tobacco cessation  and how I can support efforts when pt is ready to quit      diet / exercise rev'd  Follow weights    Follow up 3 months / pt declined

## 2024-03-27 ENCOUNTER — TELEPHONE (OUTPATIENT)
Dept: RADIATION ONCOLOGY | Facility: HOSPITAL | Age: 64
End: 2024-03-27
Payer: COMMERCIAL

## 2024-03-27 NOTE — TELEPHONE ENCOUNTER
Called pt. to remind of appointment on 3/28/024 at 9:00 am with FABIENNE Estevez. Pt's phone is no longer in service  Was unable to reach the pt.

## 2024-03-29 ENCOUNTER — APPOINTMENT (OUTPATIENT)
Dept: NEPHROLOGY | Facility: CLINIC | Age: 64
End: 2024-03-29
Payer: COMMERCIAL

## 2024-04-03 ENCOUNTER — TELEPHONE (OUTPATIENT)
Dept: RADIATION ONCOLOGY | Facility: HOSPITAL | Age: 64
End: 2024-04-03
Payer: COMMERCIAL

## 2024-04-03 NOTE — TELEPHONE ENCOUNTER
Called pt. to remind of appointment on 4/4/2024 at 3:00 pm with FABIENNE Estevez.  Pt answered and confirmed appointment.

## 2024-04-04 ENCOUNTER — HOSPITAL ENCOUNTER (OUTPATIENT)
Dept: RADIATION ONCOLOGY | Facility: HOSPITAL | Age: 64
Setting detail: RADIATION/ONCOLOGY SERIES
Discharge: HOME | End: 2024-04-04
Payer: COMMERCIAL

## 2024-04-04 VITALS
HEART RATE: 85 BPM | WEIGHT: 315 LBS | TEMPERATURE: 96.6 F | OXYGEN SATURATION: 96 % | HEIGHT: 69 IN | RESPIRATION RATE: 18 BRPM | BODY MASS INDEX: 46.65 KG/M2 | DIASTOLIC BLOOD PRESSURE: 95 MMHG | SYSTOLIC BLOOD PRESSURE: 158 MMHG

## 2024-04-04 DIAGNOSIS — N18.32 STAGE 3B CHRONIC KIDNEY DISEASE (MULTI): ICD-10-CM

## 2024-04-04 DIAGNOSIS — C61 PROSTATE CANCER (MULTI): Primary | ICD-10-CM

## 2024-04-04 ASSESSMENT — ENCOUNTER SYMPTOMS
DEPRESSION: 0
LOSS OF SENSATION IN FEET: 0
OCCASIONAL FEELINGS OF UNSTEADINESS: 0

## 2024-04-04 ASSESSMENT — PAIN SCALES - GENERAL: PAINLEVEL: 10-WORST PAIN EVER

## 2024-04-04 ASSESSMENT — COLUMBIA-SUICIDE SEVERITY RATING SCALE - C-SSRS
1. IN THE PAST MONTH, HAVE YOU WISHED YOU WERE DEAD OR WISHED YOU COULD GO TO SLEEP AND NOT WAKE UP?: NO
6. HAVE YOU EVER DONE ANYTHING, STARTED TO DO ANYTHING, OR PREPARED TO DO ANYTHING TO END YOUR LIFE?: NO
2. HAVE YOU ACTUALLY HAD ANY THOUGHTS OF KILLING YOURSELF?: NO

## 2024-04-19 ENCOUNTER — OFFICE VISIT (OUTPATIENT)
Dept: NEPHROLOGY | Facility: CLINIC | Age: 64
End: 2024-04-19
Payer: COMMERCIAL

## 2024-04-19 VITALS
BODY MASS INDEX: 45.1 KG/M2 | HEIGHT: 70 IN | DIASTOLIC BLOOD PRESSURE: 122 MMHG | HEART RATE: 65 BPM | SYSTOLIC BLOOD PRESSURE: 209 MMHG | WEIGHT: 315 LBS

## 2024-04-19 DIAGNOSIS — I10 ESSENTIAL HYPERTENSION: ICD-10-CM

## 2024-04-19 DIAGNOSIS — N18.32 STAGE 3B CHRONIC KIDNEY DISEASE (MULTI): Primary | ICD-10-CM

## 2024-04-19 PROCEDURE — 99214 OFFICE O/P EST MOD 30 MIN: CPT | Performed by: INTERNAL MEDICINE

## 2024-04-19 PROCEDURE — 3077F SYST BP >= 140 MM HG: CPT | Performed by: INTERNAL MEDICINE

## 2024-04-19 PROCEDURE — 3044F HG A1C LEVEL LT 7.0%: CPT | Performed by: INTERNAL MEDICINE

## 2024-04-19 PROCEDURE — 3080F DIAST BP >= 90 MM HG: CPT | Performed by: INTERNAL MEDICINE

## 2024-04-19 PROCEDURE — 4010F ACE/ARB THERAPY RXD/TAKEN: CPT | Performed by: INTERNAL MEDICINE

## 2024-04-19 PROCEDURE — 1036F TOBACCO NON-USER: CPT | Performed by: INTERNAL MEDICINE

## 2024-04-19 PROCEDURE — 3048F LDL-C <100 MG/DL: CPT | Performed by: INTERNAL MEDICINE

## 2024-04-19 RX ORDER — IRBESARTAN 150 MG/1
150 TABLET ORAL DAILY
Qty: 90 TABLET | Refills: 3 | Status: SHIPPED | OUTPATIENT
Start: 2024-04-19 | End: 2025-04-19

## 2024-04-19 NOTE — PROGRESS NOTES
Rei ANGULO Alcocer   63 y.o.    @@  [facility]/Room: [id number]/Room/bed info not found    Subjective:   The patient is being seen for a routine clinic follow-up of chronic kidney disease. Recently, the disease has been stable. Disease complications:  No hyperkalemia, no hypocalcemia, no hyperphosphatemia, no metabolic acidosis, no coagulopathy, no uremic encephalopathy, no neuropathy and no renal osteodystrophy. The patient is currently asymptomatic. No associated symptoms are reported.       Meds:   Current Outpatient Medications   Medication Sig Dispense Refill    allopurinol (Zyloprim) 100 mg tablet Take 1 tablet (100 mg) by mouth once daily. Do not start before March 1, 2024. 90 tablet 3    amLODIPine (Norvasc) 10 mg tablet TAKE 1 TABLET BY MOUTH EVERY DAY *PATIENT NEEDS APPOINTMENT* 90 tablet 0    carvedilol (Coreg) 25 mg tablet Take 1 tablet (25 mg) by mouth 2 times a day with meals. 180 tablet 1    chlorthalidone (Hygroton) 25 mg tablet TAKE 1 TABLET BY MOUTH EVERY DAY 90 tablet 1    Eliquis 5 mg tablet Take 1 tablet (5 mg) by mouth 2 times a day.      gabapentin (Neurontin) 100 mg capsule Take 1 capsule (100 mg) by mouth every 6 hours during the day. 90 capsule 0    hydrALAZINE (Apresoline) 10 mg tablet TAKE 1 TABLET BY MOUTH THREE TIMES A  tablet 1     No current facility-administered medications for this visit.          ROS:  The patient is awake and oriented. No dizziness or lightheadedness. No chills and no fever. No headaches. No nausea and no vomiting. No shortness of breath. No cough. No sputum. No chest pain. No chest tightness. No abdominal pain. No diarrhea and no constipation. No hematemesis or hemoptysis. No hematuria. No rectal bleeding. No melena. No epistaxis. No urinary symptoms. No flank pain. No leg edema. No leg pain. No weakness. No itching. Overall, the rest of the review of systems is also negative.  12 point review of systems otherwise negative as stated in HPI.        Physical  Examination:        Vitals:    04/19/24 1210   BP: (!) 209/122   Pulse: 65     General: The patient is awake, oriented, and is not in any distress.  Head and Neck: Normocephalic. No periorbital edema.  Eyes: non-icteric  Respiratory: Symmetric air entry. Symmetric chest expansion.No respiratory distress.  Cardiovascular: Symmetric peripheral pulses.  Skin: No maculopapular rash.  Abdomen: soft, nt/nd  Musculoskeletal: No peripheral edema in both left and right upper extremities.  No edema in either left or right lower extremities.  Neuro Exam: Speech is fluent. Moves extremities.    Imaging:  === 02/10/23 ===    US RENAL COMPLETE    - Impression -  Renal cysts, larger on the right. No hydronephrosis.       Blood Labs:  No results found for this or any previous visit (from the past 24 hour(s)).   Lab Results   Component Value Date    .0 (H) 01/16/2023    PROTUR 30(1+) (A) 01/09/2023    PHOS 4.5 01/16/2023      Lab Results   Component Value Date    GLUCOSE 125 (H) 02/16/2024    CALCIUM 9.3 02/16/2024     02/16/2024    K 3.5 02/16/2024    CO2 29 02/16/2024     02/16/2024    BUN 29 (H) 02/16/2024    CREATININE 2.18 (H) 02/16/2024         Assessment and Plan:    62 year old gentleman with history of prostate cancer since 2019. Currently he is not on any active treatment. The patient is a scheduled to see his oncologist to discuss about treatment option. He was treated with ADT and apalutamide/Erleada. He also has history of ureteral stone s/p cystoscopy, left retrograde pyelogram,left ureteral stent done 12/1/22.     #1 chronic kidney disease stage IV. His last creatinine level is 2.1. Blood pressure is high. Normal potassium and bicarb level. I had asked for a kidney ultrasound. PTH, phosphorus, 25-hydroxy vitamin D, microscopic urinalysis, and spot urine protein to creatinine ratio but none are done. I reordered the tests again.     2. Hypertension. Blood pressure is high. He is on multiple BP meds.  I added Irbesartan to his meds. BMP will be checked in about 3-4 weeks.     3. Prostate cancer.     I will see him in about 4 months for follow-up.         Jerrod Roland MD  Senior Attending Physician  Director of Onco-Nephrology Program  Division of Nephrology & Hypertension  [facility]

## 2024-04-22 ENCOUNTER — HOSPITAL ENCOUNTER (OUTPATIENT)
Dept: RADIOLOGY | Facility: CLINIC | Age: 64
Discharge: HOME | End: 2024-04-22
Payer: COMMERCIAL

## 2024-04-22 DIAGNOSIS — N18.32 STAGE 3B CHRONIC KIDNEY DISEASE (MULTI): ICD-10-CM

## 2024-04-22 DIAGNOSIS — I10 ESSENTIAL HYPERTENSION: ICD-10-CM

## 2024-04-22 PROCEDURE — 76770 US EXAM ABDO BACK WALL COMP: CPT

## 2024-04-22 PROCEDURE — 76770 US EXAM ABDO BACK WALL COMP: CPT | Performed by: RADIOLOGY

## 2024-05-08 ENCOUNTER — APPOINTMENT (OUTPATIENT)
Dept: NEPHROLOGY | Facility: CLINIC | Age: 64
End: 2024-05-08
Payer: COMMERCIAL

## 2024-05-17 ENCOUNTER — TELEPHONE (OUTPATIENT)
Dept: HEMATOLOGY/ONCOLOGY | Facility: HOSPITAL | Age: 64
End: 2024-05-17
Payer: COMMERCIAL

## 2024-05-17 ENCOUNTER — APPOINTMENT (OUTPATIENT)
Dept: HEMATOLOGY/ONCOLOGY | Facility: HOSPITAL | Age: 64
End: 2024-05-17
Payer: COMMERCIAL

## 2024-05-17 DIAGNOSIS — C61 PROSTATE CANCER (MULTI): Primary | ICD-10-CM

## 2024-05-21 ENCOUNTER — LAB (OUTPATIENT)
Dept: LAB | Facility: HOSPITAL | Age: 64
End: 2024-05-21
Payer: COMMERCIAL

## 2024-05-21 DIAGNOSIS — C61 PROSTATE CANCER (MULTI): ICD-10-CM

## 2024-05-21 LAB
ALBUMIN SERPL BCP-MCNC: 3.8 G/DL (ref 3.4–5)
ALP SERPL-CCNC: 80 U/L (ref 33–136)
ALT SERPL W P-5'-P-CCNC: 9 U/L (ref 10–52)
ANION GAP SERPL CALC-SCNC: 12 MMOL/L (ref 10–20)
AST SERPL W P-5'-P-CCNC: 13 U/L (ref 9–39)
BASOPHILS # BLD AUTO: 0.02 X10*3/UL (ref 0–0.1)
BASOPHILS NFR BLD AUTO: 0.5 %
BILIRUB SERPL-MCNC: 0.5 MG/DL (ref 0–1.2)
BUN SERPL-MCNC: 28 MG/DL (ref 6–23)
CALCIUM SERPL-MCNC: 9.3 MG/DL (ref 8.6–10.3)
CHLORIDE SERPL-SCNC: 100 MMOL/L (ref 98–107)
CO2 SERPL-SCNC: 27 MMOL/L (ref 21–32)
CREAT SERPL-MCNC: 2.03 MG/DL (ref 0.5–1.3)
EGFRCR SERPLBLD CKD-EPI 2021: 37 ML/MIN/1.73M*2
EOSINOPHIL # BLD AUTO: 0.08 X10*3/UL (ref 0–0.7)
EOSINOPHIL NFR BLD AUTO: 2.2 %
ERYTHROCYTE [DISTWIDTH] IN BLOOD BY AUTOMATED COUNT: 15.5 % (ref 11.5–14.5)
GLUCOSE SERPL-MCNC: 112 MG/DL (ref 74–99)
HCT VFR BLD AUTO: 39.8 % (ref 41–52)
HGB BLD-MCNC: 12.7 G/DL (ref 13.5–17.5)
IMM GRANULOCYTES # BLD AUTO: 0.01 X10*3/UL (ref 0–0.7)
IMM GRANULOCYTES NFR BLD AUTO: 0.3 % (ref 0–0.9)
LYMPHOCYTES # BLD AUTO: 0.45 X10*3/UL (ref 1.2–4.8)
LYMPHOCYTES NFR BLD AUTO: 12.4 %
MCH RBC QN AUTO: 25.9 PG (ref 26–34)
MCHC RBC AUTO-ENTMCNC: 31.9 G/DL (ref 32–36)
MCV RBC AUTO: 81 FL (ref 80–100)
MONOCYTES # BLD AUTO: 0.49 X10*3/UL (ref 0.1–1)
MONOCYTES NFR BLD AUTO: 13.5 %
NEUTROPHILS # BLD AUTO: 2.59 X10*3/UL (ref 1.2–7.7)
NEUTROPHILS NFR BLD AUTO: 71.1 %
NRBC BLD-RTO: 0 /100 WBCS (ref 0–0)
PLATELET # BLD AUTO: 204 X10*3/UL (ref 150–450)
POTASSIUM SERPL-SCNC: 3.4 MMOL/L (ref 3.5–5.3)
PROT SERPL-MCNC: 8.6 G/DL (ref 6.4–8.2)
PSA SERPL-MCNC: <0.1 NG/ML
RBC # BLD AUTO: 4.9 X10*6/UL (ref 4.5–5.9)
SODIUM SERPL-SCNC: 136 MMOL/L (ref 136–145)
TESTOST SERPL-MCNC: 111 NG/DL (ref 240–1000)
WBC # BLD AUTO: 3.6 X10*3/UL (ref 4.4–11.3)

## 2024-05-21 PROCEDURE — 36415 COLL VENOUS BLD VENIPUNCTURE: CPT

## 2024-05-21 PROCEDURE — 85025 COMPLETE CBC W/AUTO DIFF WBC: CPT

## 2024-05-21 PROCEDURE — 80053 COMPREHEN METABOLIC PANEL: CPT

## 2024-05-21 PROCEDURE — 84153 ASSAY OF PSA TOTAL: CPT

## 2024-05-21 PROCEDURE — 84403 ASSAY OF TOTAL TESTOSTERONE: CPT

## 2024-05-21 NOTE — PROGRESS NOTES
Oncology Follow up Note  Phone visit / consent obtained    Cancer History  Prostate Cancer - Stage IV   Sites of disease - bone , nodes  Treatment  - 11/2019 .7, 12/22/2019 98.84, prostate biopsy 2/2020 - GL 4+4=8, multiple cores,   -MRI of the prostate performed January 2020 oxana mets,   2/2020 - urethral stricture status post urethroplasty, Bone scan 2/2020 - focal abnormality in the right ischium highly concerning for metastatic disease,   -ADT 3/2020 /   -Apalutamide/Erleada 4/2020 - rad onc hold on XRT due to pandemic, dose modified 9/28/2020 2 tabs  -Admission 10/2020 - persistent difficulty with hypertensive crisis and noncompliance, stable echo, discharged on amlodipine, hydrochlorothiazide, hydralazine, clonidine  -Modified dose of 2 tabs 11/2020- yet unclear if compliant, due to cost  -Admission 12/24/20 - Kidney stones, CRISTIN, UTI, elevated BP  -Lost to follow up non compliance and did get one dose of ADT 11/21 last dose 3 m and refused XRT  -Admission 11/30/22 / Kidney stones / abd pain / CRISTIN / s/p cysto / removal of stones / left stent/ uncontrolled HTN, Admission 12/22  C diff / f/u with urology  / repeat work up no e/o mets oxana disease and primary disease (PET PSMA 2/23 - see below no clear bony met)  3/9/2023: Starts ADT- 3m Trelstar  , Radiation to Prostate and node 5/23/23, last ADT 6/23 3m dose, non compliance and refused ongoing treatment monitored only    Provider Team  Elder Perez MD Barbara B Vizy, MD Alvin Schmaier  [facility] - cardiology  Dr Leslie   Wife - [phone]  Asuncion Estevez / Nayeli Masters    Other contributing history  HTN, Arthritis, Urethral stricutre, cysto negative, SP catheter, urethroplasty 2/18/2020, plate fabian , CKD, Afibr on eliquis , pancytopenia heme referral improved    Interval history:  The patient and the wife were on the phone.  He is overall doing well still some issues with some fatigue and some ongoing issues with some occasional  muscle aches and joint aches.  He denies any other major new symptoms.  Denies any ongoing issues with nausea, vomiting, fevers, chills, easy bruising or bleeding denies any worsening rectal pain or rectal discomfort.  Denies any issues with diarrhea.    ROS:  10-pt ROS reviewed and negative except as mentioned above.    Medications: below was reviewed and is accurate  Current Outpatient Medications   Medication Instructions    allopurinol (ZYLOPRIM) 100 mg, oral, Daily    amLODIPine (Norvasc) 10 mg tablet TAKE 1 TABLET BY MOUTH EVERY DAY *PATIENT NEEDS APPOINTMENT*    carvedilol (COREG) 25 mg, oral, 2 times daily (morning and late afternoon)    chlorthalidone (HYGROTON) 25 mg, oral, Daily    Eliquis 5 mg, oral, 2 times daily    gabapentin (NEURONTIN) 100 mg, oral, Every 6 hours during day    hydrALAZINE (APRESOLINE) 10 mg, oral, 3 times daily    irbesartan (AVAPRO) 150 mg, oral, Daily        Detailed family history and social history reviewed in detail and updated per epic charting and in detail with the patient    Physical exam:  There were no vitals filed for this visit.      Radiology review  Reviewed in detail personally and for detail see results in EPIC  CT 1/23 - renal cysts not enhanced hard to eval, node near pancreas 1.8x2.8 cm, PET PSMA- 2/23 -increased uptake in the prostate  cancer and external iliac lymph node nonspecific subcutaneous nodule in the left lower abdomen no uptake in the interpolar kidney cyst, no other uptake seen including  body near the pancreas,  MRI pancreas 4/23  - mildly enlarged perihepatic LN, 1.9cm, non sepcific  MRI abdomen 3/24- stable to decreased prominent peripancreatic lymph node nonspecific cholelithiasis without cholecystitis, incompletely characterized and partially visualized mild circumferential thickening of the transverse and proximal descending colon possible colitis subcentimeter hepatic cysts simple renal cyst    Genomics Data    Laboratory review  Reviewed in  detail personally and for detail see results in EPIC  Lab Results   Component Value Date    WBC 3.6 (L) 05/21/2024    WBC 4.7 01/03/2024    WBC 3.6 (L) 06/23/2023     Lab Results   Component Value Date    HGB 12.7 (L) 05/21/2024    HGB 12.5 (L) 01/03/2024    HGB 11.9 (L) 06/23/2023     Lab Results   Component Value Date     05/21/2024     01/03/2024     (L) 06/23/2023     Lab Results   Component Value Date    GLUCOSE 112 (H) 05/21/2024    CALCIUM 9.3 05/21/2024     05/21/2024    K 3.4 (L) 05/21/2024    CO2 27 05/21/2024     05/21/2024    BUN 28 (H) 05/21/2024    CREATININE 2.03 (H) 05/21/2024     Lab Results   Component Value Date    PSA <0.10 05/21/2024    PSA 0.19 02/16/2024    PSA 0.91 06/23/2023     Lab Results   Component Value Date    ALT 9 (L) 05/21/2024    AST 13 05/21/2024    ALKPHOS 80 05/21/2024    BILITOT 0.5 05/21/2024     Lab Results   Component Value Date    TESTOSTERONE 111 (L) 05/21/2024       ASSESSMENT AND PLAN   Prostate Cancer -discussed in detail PSA remains suppressed however did notice still borderline testosterone.  Discussed the pros and cons of testosterone treatments to help with the potential risk of recurrence of his cancer at this point he does have some fatigue and some symptoms related to low testosterone state but would prefer to monitor we will go ahead and get blood work and follow-up in 3 months and he will contact us for any other new complaints.  Pancytopenia-continue follow-up with hematology some improvement of hemoglobin white blood cell count continues to fluctuate.    Pancreas Findings-discussed MRI findings shows continued improvement nonspecific in nature could potentially do yearly versus no further ongoing monitoring.  CKD -recently seen by nephrology and continue to monitor  Colon findings -discussed in detail nonspecific finding seen on the MRI pros and cons of colonoscopy which he is unclear when he had an referral to GI he has no  worsening symptoms he is aware of the risks of underlying disease and wanted to hold off for now.  Hypokalemia-continue to monitor and we will replete potassium and repeat one month    discussed need while on ADT  maintain adequate cardiovascular health, exercise, dietary modifications,  bone health with calcium 1000 mg with vitamin D 400-800 international units      Elder Perez MD  Senior Attending Physician/  in Medicine [facility] [employer]  [facility] / [facility]  Patient line [phone]  Fax [phone]

## 2024-05-22 ENCOUNTER — TELEMEDICINE (OUTPATIENT)
Dept: HEMATOLOGY/ONCOLOGY | Facility: CLINIC | Age: 64
End: 2024-05-22
Payer: COMMERCIAL

## 2024-05-22 DIAGNOSIS — D61.818 PANCYTOPENIA (MULTI): ICD-10-CM

## 2024-05-22 DIAGNOSIS — C61 PROSTATE CANCER (MULTI): ICD-10-CM

## 2024-05-22 DIAGNOSIS — K86.89 MASS OF PANCREAS (HHS-HCC): ICD-10-CM

## 2024-05-22 PROCEDURE — 3044F HG A1C LEVEL LT 7.0%: CPT | Performed by: INTERNAL MEDICINE

## 2024-05-22 PROCEDURE — 4010F ACE/ARB THERAPY RXD/TAKEN: CPT | Performed by: INTERNAL MEDICINE

## 2024-05-22 PROCEDURE — 3048F LDL-C <100 MG/DL: CPT | Performed by: INTERNAL MEDICINE

## 2024-05-22 PROCEDURE — 99442 PR PHYS/QHP TELEPHONE EVALUATION 11-20 MIN: CPT | Performed by: INTERNAL MEDICINE

## 2024-05-22 RX ORDER — POTASSIUM CHLORIDE 750 MG/1
10 TABLET, FILM COATED, EXTENDED RELEASE ORAL DAILY
Qty: 30 TABLET | Refills: 0 | Status: SHIPPED | OUTPATIENT
Start: 2024-05-22 | End: 2025-05-22

## 2024-06-26 ENCOUNTER — APPOINTMENT (OUTPATIENT)
Dept: HEMATOLOGY/ONCOLOGY | Facility: HOSPITAL | Age: 64
End: 2024-06-26
Payer: COMMERCIAL

## 2024-07-19 ENCOUNTER — APPOINTMENT (OUTPATIENT)
Dept: NEPHROLOGY | Facility: CLINIC | Age: 64
End: 2024-07-19
Payer: COMMERCIAL

## 2024-07-31 ENCOUNTER — TELEPHONE (OUTPATIENT)
Dept: HEMATOLOGY/ONCOLOGY | Facility: CLINIC | Age: 64
End: 2024-07-31
Payer: COMMERCIAL

## 2024-07-31 DIAGNOSIS — D61.818 PANCYTOPENIA (MULTI): Primary | ICD-10-CM

## 2024-08-09 ENCOUNTER — APPOINTMENT (OUTPATIENT)
Dept: NEPHROLOGY | Facility: CLINIC | Age: 64
End: 2024-08-09
Payer: COMMERCIAL

## 2024-08-17 DIAGNOSIS — N18.4 CHRONIC KIDNEY DISEASE, STAGE 4 (SEVERE) (MULTI): ICD-10-CM

## 2024-08-17 RX ORDER — CHLORTHALIDONE 25 MG/1
25 TABLET ORAL DAILY
Qty: 90 TABLET | Refills: 0 | Status: SHIPPED | OUTPATIENT
Start: 2024-08-17

## 2024-08-19 NOTE — PROGRESS NOTES
Oncology Follow up Note  Phone visit / consent obtained    Cancer History  Prostate Cancer - Stage IV   Sites of disease - bone , nodes  Treatment  - 11/2019 .7, 12/22/2019 98.84, prostate biopsy 2/2020 - GL 4+4=8, multiple cores,   -MRI of the prostate performed January 2020 oxana mets,   2/2020 - urethral stricture status post urethroplasty, Bone scan 2/2020 - focal abnormality in the right ischium highly concerning for metastatic disease,   -ADT 3/2020 /   -Apalutamide/Erleada 4/2020 - rad onc hold on XRT due to pandemic, dose modified 9/28/2020 2 tabs  -Admission 10/2020 - persistent difficulty with hypertensive crisis and noncompliance, stable echo, discharged on amlodipine, hydrochlorothiazide, hydralazine, clonidine  -Modified dose of 2 tabs 11/2020- yet unclear if compliant, due to cost  -Admission 12/24/20 - Kidney stones, CRISTIN, UTI, elevated BP  -Lost to follow up non compliance and did get one dose of ADT 11/21 last dose 3 m and refused XRT  -Admission 11/30/22 / Kidney stones / abd pain / CRISTIN / s/p cysto / removal of stones / left stent/ uncontrolled HTN, Admission 12/22  C diff / f/u with urology  / repeat work up no e/o mets oxana disease and primary disease (PET PSMA 2/23 - see below no clear bony met)  3/9/2023: Starts ADT- 3m Trelstar  , Radiation to Prostate and node 5/23/23, last ADT 6/23 3m dose, non compliance and refused ongoing treatment monitored only    Provider Team  Elder Perez MD Barbara B Vizy, MD Alvin Schmaier  [facility] - cardiology  Dr Leslie   Wife - [phone]  Asuncion Boother Herb / Nayeli Masters    Other contributing history  HTN, Arthritis, Urethral stricutre, cysto negative, SP catheter, urethroplasty 2/18/2020, plate fabian , CKD, Afibr on eliquis , pancytopenia heme referral improved    Interval history:  The patient is doing well without any other major new symptoms denies any ongoing issues with nausea, vomiting, fevers, chills denies any worsening  issues without or worsening issues with chest pain or chest tightness.  Denies any worsening issues with melena or bright red blood per rectum denies any changes in his bowel movements.  Appetite and energy is otherwise well denies any residual complications from his prior ADT and hormones    ROS:  10-pt ROS reviewed and negative except as mentioned above.    Medications: below was reviewed and is accurate  Current Outpatient Medications   Medication Instructions    allopurinol (ZYLOPRIM) 100 mg, oral, Daily    amLODIPine (Norvasc) 10 mg tablet TAKE 1 TABLET BY MOUTH EVERY DAY *PATIENT NEEDS APPOINTMENT*    carvedilol (COREG) 25 mg, oral, 2 times daily (morning and late afternoon)    chlorthalidone (HYGROTON) 25 mg, oral, Daily    Eliquis 5 mg, oral, 2 times daily    gabapentin (NEURONTIN) 100 mg, oral, Every 6 hours during day    hydrALAZINE (APRESOLINE) 10 mg, oral, 3 times daily    irbesartan (AVAPRO) 150 mg, oral, Daily    potassium chloride CR (Klor-Con) 10 mEq ER tablet 10 mEq, oral, Daily, Do not crush, chew, or split.        Detailed family history and social history reviewed in detail and updated per epic charting and in detail with the patient    Physical exam:  There were no vitals filed for this visit.      Radiology review  Reviewed in detail personally and for detail see results in EPIC  CT 1/23 - renal cysts not enhanced hard to eval, node near pancreas 1.8x2.8 cm, PET PSMA- 2/23 -increased uptake in the prostate  cancer and external iliac lymph node nonspecific subcutaneous nodule in the left lower abdomen no uptake in the interpolar kidney cyst, no other uptake seen including  body near the pancreas,  MRI pancreas 4/23  - mildly enlarged perihepatic LN, 1.9cm, non sepcific  MRI abdomen 3/24- stable to decreased prominent peripancreatic lymph node nonspecific cholelithiasis without cholecystitis, incompletely characterized and partially visualized mild circumferential thickening of the transverse and  proximal descending colon possible colitis subcentimeter hepatic cysts simple renal cyst    Genomics Data    Laboratory review  Reviewed in detail personally and for detail see results in EPIC  Lab Results   Component Value Date    WBC 3.6 (L) 05/21/2024    HGB 12.7 (L) 05/21/2024    HCT 39.8 (L) 05/21/2024    MCV 81 05/21/2024     05/21/2024     Lab Results   Component Value Date    GLUCOSE 112 (H) 05/21/2024    CALCIUM 9.3 05/21/2024     05/21/2024    K 3.4 (L) 05/21/2024    CO2 27 05/21/2024     05/21/2024    BUN 28 (H) 05/21/2024    CREATININE 2.03 (H) 05/21/2024     Lab Results   Component Value Date    PSA <0.10 05/21/2024    PSA 0.19 02/16/2024    PSA 0.91 06/23/2023     Lab Results   Component Value Date    ALT 9 (L) 05/21/2024    AST 13 05/21/2024    ALKPHOS 80 05/21/2024    BILITOT 0.5 05/21/2024     Lab Results   Component Value Date    TESTOSTERONE 111 (L) 05/21/2024         ASSESSMENT AND PLAN   Prostate Cancer -at this point does need recommendation for follow-up blood work we will contact him once he gets the results and then arrange follow-up accordingly continue followed up and monitoring for recurrence will need to follow-up PSA and testosterone and arrange follow-up with radiation oncology.  Pancytopenia-continue follow-up with hematology some improvement of hemoglobin white blood cell count continues to fluctuate.  We will await recent blood work and potentially referral back to hematology as needed  Pancreas Findings-discussed MRI findings shows continued improvement nonspecific in nature could potentially do yearly versus no further ongoing monitoring.  Discussed pros and cons and prefers yearly we will get again in March 2025  CKD -recently seen by nephrology and continue to monitor  Colon findings -discussed in detail nonspecific finding seen on the MRI pros and cons of colonoscopy which he is unclear when he had an referral to GI he has no worsening symptoms he is aware of  the risks of underlying disease and wanted to hold off for now.  He is aware of underlying risks  Hypokalemia-continue to monitor and await repeat blood work and see if further potassium supplementation is needed    discussed need while on ADT  maintain adequate cardiovascular health, exercise, dietary modifications,  bone health with calcium 1000 mg with vitamin D 400-800 international units      Elder Perez MD  Senior Attending Physician/  in Medicine [facility] [employer]  [facility] / [facility]  Patient line [phone]  Fax [phone]

## 2024-08-20 ENCOUNTER — TELEMEDICINE (OUTPATIENT)
Dept: HEMATOLOGY/ONCOLOGY | Facility: CLINIC | Age: 64
End: 2024-08-20
Payer: COMMERCIAL

## 2024-08-20 DIAGNOSIS — K86.89 MASS OF PANCREAS (HHS-HCC): ICD-10-CM

## 2024-08-20 DIAGNOSIS — C61 PROSTATE CANCER (MULTI): ICD-10-CM

## 2024-08-20 DIAGNOSIS — D61.818 PANCYTOPENIA (MULTI): ICD-10-CM

## 2024-08-20 PROCEDURE — 99442 PR PHYS/QHP TELEPHONE EVALUATION 11-20 MIN: CPT | Performed by: INTERNAL MEDICINE

## 2024-08-20 PROCEDURE — 3044F HG A1C LEVEL LT 7.0%: CPT | Performed by: INTERNAL MEDICINE

## 2024-08-20 PROCEDURE — 3048F LDL-C <100 MG/DL: CPT | Performed by: INTERNAL MEDICINE

## 2024-08-20 PROCEDURE — 4010F ACE/ARB THERAPY RXD/TAKEN: CPT | Performed by: INTERNAL MEDICINE

## 2024-08-21 ENCOUNTER — APPOINTMENT (OUTPATIENT)
Dept: HEMATOLOGY/ONCOLOGY | Facility: CLINIC | Age: 64
End: 2024-08-21
Payer: COMMERCIAL

## 2024-09-04 ENCOUNTER — TELEPHONE (OUTPATIENT)
Dept: HEMATOLOGY/ONCOLOGY | Facility: CLINIC | Age: 64
End: 2024-09-04
Payer: COMMERCIAL

## 2024-09-04 NOTE — TELEPHONE ENCOUNTER
Message left patient has been noncompliant with blood work and unable to get in touch with patient message was also sent through [employer] to get labs done from last visit

## 2024-10-02 ENCOUNTER — TELEPHONE (OUTPATIENT)
Dept: RADIATION ONCOLOGY | Facility: HOSPITAL | Age: 64
End: 2024-10-02
Payer: COMMERCIAL

## 2024-10-02 NOTE — PROGRESS NOTES
Cancer synopsis:  Rad/onc: Herb CHATMAN  Med/onc: Dr. Perez    Prostate Cancer - Stage IV   Sites of disease - bone , nodes  Treatment  - 11/2019 .7, 12/22/2019 98.84, prostate biopsy 2/2020 - GL 4+4=8, multiple cores,   -MRI of the prostate performed January 2020 oxana mets,   2/2020 - urethral stricture status post urethroplasty, Bone scan 2/2020 - focal abnormality in the right ischium highly concerning for metastatic disease,   -ADT 3/2020 /   -Apalutamide/Erleada 4/2020 - rad onc hold on XRT due to pandemic, dose modified 9/28/2020 2 tabs  -Admission 10/2020 - persistent difficulty with hypertensive crisis and noncompliance, stable echo, discharged on amlodipine, hydrochlorothiazide, hydralazine, clonidine  -Modified dose of 2 tabs 11/2020- yet unclear if compliant, due to cost  -Admission 12/24/20 - Kidney stones, CRISTIN, UTI, elevated BP  -Lost to follow up non compliance and did get one dose of ADT 11/21 last dose 3 m and refused XRT  -Admission 11/30/22 / Kidney stones / abd pain / CRISTIN / s/p cysto / removal of stones / left stent/ uncontrolled HTN, Admission 12/22  C diff / f/u with urology  / repeat work up no e/o mets oxana disease and primary disease (PET PSMA 2/23 - see below no clear bony met)  3/9/2023: Starts ADT- 3m Trelstar  , Radiation to Prostate and node 5/23/23, last ADT 6/23 3m dose, non compliance and refused ongoing treatment monitored only    History of presenting illness:    Patient ID: [id number]     Rei Alcocer is a 63 y.o. male who presents for his very high risk locally metastatic Stage IV prostate cancer now s/p RT and completed hormonal therapy early (was non compliant and refused further ADT)    RT Site: prostate and Ln  RT Date: 05/23/2023  Hormone therapy: Yes completed 3m and refused further  Hot Flushes: Denies  Fatigue: Denies  Bone pain: Denies  ED: Not concern at this time per patient  - Quality of erections during the last 4 weeks: 1 = None at all  - Use of  erectile dysfunction medications:  None  IPSS: did not complete  Urinary symptoms: Denies dysuria, hematuria, frequency or urine leakage. Denies weak stream. States bladder is emptying all the way. Does report nocturia about 3 times a night but comfortable with current amount.  Urinary Medications: No  Rectal bleeding: Denies  Colonoscopy: none on file  Other systems: Denies SOB, CP or fever. States is feeling the best he has in the last year and a half.    Review of systems:  Review of Systems   Constitutional:  Negative for fatigue, fever and unexpected weight change.   Respiratory:  Negative for cough, chest tightness and shortness of breath.    Cardiovascular:  Negative for chest pain, palpitations and leg swelling.   Gastrointestinal:  Negative for abdominal pain, anal bleeding, blood in stool, constipation, diarrhea and rectal pain.   Endocrine: Negative for cold intolerance, heat intolerance and polyuria.   Genitourinary:  Negative for decreased urine volume, difficulty urinating, dysuria, frequency, hematuria and urgency.   Musculoskeletal:  Negative for arthralgias, back pain, gait problem and joint swelling.   Skin: Negative.    Allergic/Immunologic: Negative.    Neurological:  Negative for dizziness, syncope and weakness.   Psychiatric/Behavioral: Negative.       PERFORMANCE STATUS:  KPS/ECO, Able to carry on normal activity; minor signs or symptoms of disease (ECOG equivalent 0)    Past Medical history  No past medical history on file.     Surgical/family history  No family history on file.   Past Surgical History:   Procedure Laterality Date    IR  BLADDER ASPIRATION  2019    IR  BLADDER ASPIRATION 2019 [facility] CLINICAL LEGACY    OTHER SURGICAL HISTORY  10/23/2020    Urethral dilation    OTHER SURGICAL HISTORY  10/23/2020    Ankle fracture repair      Social History  Tobacco Use: Low Risk  (2024)    Patient History     Smoking Tobacco Use: Never     Smokeless Tobacco Use: Never      Passive Exposure: Not on file       Current med list:  Current Outpatient Medications   Medication Instructions    allopurinol (ZYLOPRIM) 100 mg, oral, Daily    amLODIPine (Norvasc) 10 mg tablet TAKE 1 TABLET BY MOUTH EVERY DAY *PATIENT NEEDS APPOINTMENT*    carvedilol (COREG) 25 mg, oral, 2 times daily (morning and late afternoon)    chlorthalidone (HYGROTON) 25 mg, oral, Daily    Eliquis 5 mg, oral, 2 times daily    gabapentin (NEURONTIN) 100 mg, oral, Every 6 hours during day    hydrALAZINE (APRESOLINE) 10 mg, oral, 3 times daily    irbesartan (AVAPRO) 150 mg, oral, Daily    potassium chloride CR (Klor-Con) 10 mEq ER tablet 10 mEq, oral, Daily, Do not crush, chew, or split.      Last recorded vital:  BP (!) 224/132   Pulse 86   Temp 35.9 °C (96.6 °F) (Temporal)   Resp 18   Wt (!) 160 kg (352 lb 6.4 oz)   SpO2 97%   BMI 51.29 kg/m²     Manual BP  168/110    Physical Exam  Constitutional:       Appearance: Normal appearance.   Cardiovascular:      Rate and Rhythm: Normal rate.   Pulmonary:      Effort: Pulmonary effort is normal.      Breath sounds: Normal breath sounds.   Musculoskeletal:         General: Normal range of motion.      Cervical back: Normal range of motion.   Neurological:      Mental Status: He is alert and oriented to person, place, and time.   Psychiatric:         Mood and Affect: Mood normal.         Behavior: Behavior normal.         Thought Content: Thought content normal.         Judgment: Judgment normal.       Pertinent labs:  Prostate Specific AG   Date/Time Value Ref Range Status   05/21/2024 04:24 PM <0.10 <=4.00 ng/mL Final     Dx:  Problem List Items Addressed This Visit       Prostate cancer (Multi) - Primary    Relevant Orders    Clinic Appointment Request Follow Up; [facility] GAINES; [facility] (Completed)   Reviewed need for labs today, will call with results. Prior labs in 05/2024 where stable with undectable Psa and low testosterone recovering up to 111. CBC was  still anemic likley from CKD however low WBC was present and will re-eval today, if still low consider hematology. Low potassium that needs followed up today with labs.     Review of latent SE including rectal bleeding, hematuria, urinary strictures, ED where reviewed as well as how to contact office if s/s present. Denies latent SE. NCCN guidelines where reviewed and routine FUV of every 3m for first year and every 6m for four years for a total of five years was discussed. Patient verbalized understanding.     Reviewed importance of taking all BP medications given BP goal of SBP <140. Manual was still above 160. Patient verbalized understanding.    Discussed guidelines for 150 minutes of moderate exercise a week or 75 minutes of Vigorous exercise to help control and reduce weight gain.    Discussed importance of follow up visits with each team to adequately maintain health. Was due to see nephro 3m ago however did not go. Also was due for labs with med/onc last month and did not have done nor set up FUV with med/onc based on these labs.     PLAN:  FUV 3m virtually (FUV to ensure all aspects of care are being followed and prostate cancer is being followed d/t not following up with dr. Perez as planned)  Labs per med/onc today, repeat labs in 3m   Imaging none  FUV other providers: PCP for routine evals, med/onc for prostate cancer survivorship, nephrology for CKD.    Please contact office with any concerns:  Joel Davis [facility]  [phone]

## 2024-10-03 ENCOUNTER — LAB (OUTPATIENT)
Dept: LAB | Facility: HOSPITAL | Age: 64
End: 2024-10-03
Payer: COMMERCIAL

## 2024-10-03 ENCOUNTER — HOSPITAL ENCOUNTER (OUTPATIENT)
Dept: RADIATION ONCOLOGY | Facility: HOSPITAL | Age: 64
Setting detail: RADIATION/ONCOLOGY SERIES
Discharge: HOME | End: 2024-10-03
Payer: COMMERCIAL

## 2024-10-03 VITALS
TEMPERATURE: 96.6 F | SYSTOLIC BLOOD PRESSURE: 224 MMHG | BODY MASS INDEX: 51.29 KG/M2 | DIASTOLIC BLOOD PRESSURE: 132 MMHG | OXYGEN SATURATION: 97 % | WEIGHT: 315 LBS | RESPIRATION RATE: 18 BRPM | HEART RATE: 86 BPM

## 2024-10-03 DIAGNOSIS — I10 ESSENTIAL HYPERTENSION: ICD-10-CM

## 2024-10-03 DIAGNOSIS — C61 MALIGNANT NEOPLASM OF PROSTATE (MULTI): ICD-10-CM

## 2024-10-03 DIAGNOSIS — K86.89 MASS OF PANCREAS (HHS-HCC): ICD-10-CM

## 2024-10-03 DIAGNOSIS — C61 PROSTATE CANCER (MULTI): ICD-10-CM

## 2024-10-03 DIAGNOSIS — D61.818 PANCYTOPENIA: ICD-10-CM

## 2024-10-03 DIAGNOSIS — N18.32 STAGE 3B CHRONIC KIDNEY DISEASE (MULTI): ICD-10-CM

## 2024-10-03 DIAGNOSIS — C61 PROSTATE CANCER (MULTI): Primary | ICD-10-CM

## 2024-10-03 DIAGNOSIS — E79.0 HYPERURICEMIA: ICD-10-CM

## 2024-10-03 DIAGNOSIS — E55.9 VITAMIN D DEFICIENCY: Primary | ICD-10-CM

## 2024-10-03 LAB
25(OH)D3 SERPL-MCNC: 15 NG/ML (ref 30–100)
ANION GAP SERPL CALC-SCNC: 13 MMOL/L (ref 10–20)
BASOPHILS # BLD AUTO: 0.02 X10*3/UL (ref 0–0.1)
BASOPHILS NFR BLD AUTO: 0.5 %
BUN SERPL-MCNC: 28 MG/DL (ref 6–23)
CALCIUM SERPL-MCNC: 9.1 MG/DL (ref 8.6–10.3)
CHLORIDE SERPL-SCNC: 107 MMOL/L (ref 98–107)
CO2 SERPL-SCNC: 24 MMOL/L (ref 21–32)
CREAT SERPL-MCNC: 2.05 MG/DL (ref 0.5–1.3)
EGFRCR SERPLBLD CKD-EPI 2021: 36 ML/MIN/1.73M*2
EOSINOPHIL # BLD AUTO: 0.17 X10*3/UL (ref 0–0.7)
EOSINOPHIL NFR BLD AUTO: 4.1 %
ERYTHROCYTE [DISTWIDTH] IN BLOOD BY AUTOMATED COUNT: 14.9 % (ref 11.5–14.5)
GLUCOSE SERPL-MCNC: 108 MG/DL (ref 74–99)
HCT VFR BLD AUTO: 42.3 % (ref 41–52)
HGB BLD-MCNC: 13.4 G/DL (ref 13.5–17.5)
IMM GRANULOCYTES # BLD AUTO: 0.02 X10*3/UL (ref 0–0.7)
IMM GRANULOCYTES NFR BLD AUTO: 0.5 % (ref 0–0.9)
LYMPHOCYTES # BLD AUTO: 0.58 X10*3/UL (ref 1.2–4.8)
LYMPHOCYTES NFR BLD AUTO: 14.1 %
MCH RBC QN AUTO: 26.2 PG (ref 26–34)
MCHC RBC AUTO-ENTMCNC: 31.7 G/DL (ref 32–36)
MCV RBC AUTO: 83 FL (ref 80–100)
MONOCYTES # BLD AUTO: 0.59 X10*3/UL (ref 0.1–1)
MONOCYTES NFR BLD AUTO: 14.3 %
NEUTROPHILS # BLD AUTO: 2.74 X10*3/UL (ref 1.2–7.7)
NEUTROPHILS NFR BLD AUTO: 66.5 %
NRBC BLD-RTO: 0 /100 WBCS (ref 0–0)
PLATELET # BLD AUTO: 170 X10*3/UL (ref 150–450)
POTASSIUM SERPL-SCNC: 3.5 MMOL/L (ref 3.5–5.3)
PSA SERPL-MCNC: 0.15 NG/ML
PTH-INTACT SERPL-MCNC: 213.6 PG/ML (ref 18.5–88)
RBC # BLD AUTO: 5.12 X10*6/UL (ref 4.5–5.9)
SODIUM SERPL-SCNC: 140 MMOL/L (ref 136–145)
TESTOST SERPL-MCNC: 310 NG/DL (ref 240–1000)
URATE SERPL-MCNC: 8 MG/DL (ref 4–7.5)
WBC # BLD AUTO: 4.1 X10*3/UL (ref 4.4–11.3)

## 2024-10-03 PROCEDURE — 84550 ASSAY OF BLOOD/URIC ACID: CPT

## 2024-10-03 PROCEDURE — 84153 ASSAY OF PSA TOTAL: CPT

## 2024-10-03 PROCEDURE — 85025 COMPLETE CBC W/AUTO DIFF WBC: CPT

## 2024-10-03 PROCEDURE — 82374 ASSAY BLOOD CARBON DIOXIDE: CPT

## 2024-10-03 PROCEDURE — 80048 BASIC METABOLIC PNL TOTAL CA: CPT | Mod: CCI

## 2024-10-03 PROCEDURE — 36415 COLL VENOUS BLD VENIPUNCTURE: CPT

## 2024-10-03 PROCEDURE — 82306 VITAMIN D 25 HYDROXY: CPT

## 2024-10-03 PROCEDURE — 99214 OFFICE O/P EST MOD 30 MIN: CPT

## 2024-10-03 PROCEDURE — 84403 ASSAY OF TOTAL TESTOSTERONE: CPT

## 2024-10-03 PROCEDURE — 83970 ASSAY OF PARATHORMONE: CPT

## 2024-10-03 ASSESSMENT — ENCOUNTER SYMPTOMS
ARTHRALGIAS: 0
BACK PAIN: 0
ANAL BLEEDING: 0
CHEST TIGHTNESS: 0
FREQUENCY: 0
ALLERGIC/IMMUNOLOGIC NEGATIVE: 1
DIARRHEA: 0
WEAKNESS: 0
FEVER: 0
FATIGUE: 0
JOINT SWELLING: 0
ABDOMINAL PAIN: 0
CONSTIPATION: 0
RECTAL PAIN: 0
SHORTNESS OF BREATH: 0
PALPITATIONS: 0
UNEXPECTED WEIGHT CHANGE: 0
DIFFICULTY URINATING: 0
HEMATURIA: 0
BLOOD IN STOOL: 0
DYSURIA: 0
DIZZINESS: 0
COUGH: 0
PSYCHIATRIC NEGATIVE: 1

## 2024-10-03 ASSESSMENT — PATIENT HEALTH QUESTIONNAIRE - PHQ9
2. FEELING DOWN, DEPRESSED OR HOPELESS: NOT AT ALL
1. LITTLE INTEREST OR PLEASURE IN DOING THINGS: NOT AT ALL
SUM OF ALL RESPONSES TO PHQ9 QUESTIONS 1 AND 2: 0

## 2024-10-03 ASSESSMENT — COLUMBIA-SUICIDE SEVERITY RATING SCALE - C-SSRS
6. HAVE YOU EVER DONE ANYTHING, STARTED TO DO ANYTHING, OR PREPARED TO DO ANYTHING TO END YOUR LIFE?: NO
2. HAVE YOU ACTUALLY HAD ANY THOUGHTS OF KILLING YOURSELF?: NO
1. IN THE PAST MONTH, HAVE YOU WISHED YOU WERE DEAD OR WISHED YOU COULD GO TO SLEEP AND NOT WAKE UP?: NO

## 2024-10-04 ENCOUNTER — TELEPHONE (OUTPATIENT)
Dept: HEMATOLOGY/ONCOLOGY | Facility: HOSPITAL | Age: 64
End: 2024-10-04
Payer: COMMERCIAL

## 2024-10-04 DIAGNOSIS — C61 MALIGNANT NEOPLASM OF PROSTATE (MULTI): Primary | ICD-10-CM

## 2024-10-04 LAB
ALBUMIN SERPL BCP-MCNC: 3.8 G/DL (ref 3.4–5)
ALP SERPL-CCNC: 99 U/L (ref 33–136)
ALT SERPL W P-5'-P-CCNC: 12 U/L (ref 10–52)
ANION GAP SERPL CALC-SCNC: 14 MMOL/L (ref 10–20)
AST SERPL W P-5'-P-CCNC: 16 U/L (ref 9–39)
BILIRUB SERPL-MCNC: 0.3 MG/DL (ref 0–1.2)
BUN SERPL-MCNC: 28 MG/DL (ref 6–23)
CALCIUM SERPL-MCNC: 9.1 MG/DL (ref 8.6–10.3)
CHLORIDE SERPL-SCNC: 107 MMOL/L (ref 98–107)
CO2 SERPL-SCNC: 23 MMOL/L (ref 21–32)
CREAT SERPL-MCNC: 2.12 MG/DL (ref 0.5–1.3)
EGFRCR SERPLBLD CKD-EPI 2021: 35 ML/MIN/1.73M*2
GLUCOSE SERPL-MCNC: 105 MG/DL (ref 74–99)
POTASSIUM SERPL-SCNC: 3.7 MMOL/L (ref 3.5–5.3)
PROT SERPL-MCNC: 8.5 G/DL (ref 6.4–8.2)
SODIUM SERPL-SCNC: 140 MMOL/L (ref 136–145)

## 2024-10-04 RX ORDER — ERGOCALCIFEROL 1.25 MG/1
50000 CAPSULE ORAL
Qty: 6 CAPSULE | Refills: 2 | Status: SHIPPED | OUTPATIENT
Start: 2024-10-04

## 2024-10-04 NOTE — TELEPHONE ENCOUNTER
Left message to discuss findings  And need for follow up  Slight rise in PSA monitor  And set up follow up in 6w to 3 months

## 2024-10-06 NOTE — PROGRESS NOTES
Oncology Follow up Note  Phone visit / consent obtained    Cancer History  Prostate Cancer - Stage IV   Sites of disease - bone , nodes  Treatment  - 11/2019 .7, 12/22/2019 98.84, prostate biopsy 2/2020 - GL 4+4=8, multiple cores,   -MRI of the prostate performed January 2020 oxana mets,   2/2020 - urethral stricture status post urethroplasty, Bone scan 2/2020 - focal abnormality in the right ischium highly concerning for metastatic disease,   -ADT 3/2020 /   -Apalutamide/Erleada 4/2020 - rad onc hold on XRT due to pandemic, dose modified 9/28/2020 2 tabs  -Admission 10/2020 - persistent difficulty with hypertensive crisis and noncompliance, stable echo, discharged on amlodipine, hydrochlorothiazide, hydralazine, clonidine  -Modified dose of 2 tabs 11/2020- yet unclear if compliant, due to cost  -Admission 12/24/20 - Kidney stones, CRISTIN, UTI, elevated BP  -Lost to follow up non compliance and did get one dose of ADT 11/21 last dose 3 m and refused XRT  -Admission 11/30/22 / Kidney stones / abd pain / CRISTIN / s/p cysto / removal of stones / left stent/ uncontrolled HTN, Admission 12/22  C diff / f/u with urology  / repeat work up no e/o mets oxana disease and primary disease (PET PSMA 2/23 - see below no clear bony met)  3/9/2023: Starts ADT- 3m Trelstar  , Radiation to Prostate and node 5/23/23, last ADT 6/23 3m dose, non compliance and refused ongoing treatment monitored only    Provider Team  No ref. provider found   MD Vlad Ordaz - cardiology  Dr Leslie   Wife - [phone]  Asuncion Estevez / Nayeli Masters    Other contributing history  HTN, Arthritis, Urethral stricutre, cysto negative, SP catheter, urethroplasty 2/18/2020, plate fabian , CKD, Afibr on eliquis , pancytopenia heme referral improved    Interval history:  The patient is overall doing well without any other major new symptoms appetite and energy is fairly stable.  Continues to have still some issues with  erectile dysfunction and energy is slowly improving.  Denies any ongoing issues with nausea, vomiting, fevers or chills, easy bruising or bleeding denies any melena or bright red blood per rectum.  Denies any worsening issues and did have recent follow-up with primary care physician and nephrologist some improvement of his blood pressure but still remains elevated and some of the other symptoms that he was having related to elevated blood pressure and medications have improved.    ROS:  10-pt ROS reviewed and negative except as mentioned above.    Medications: below was reviewed and is accurate  Current Outpatient Medications   Medication Instructions    allopurinol (ZYLOPRIM) 100 mg, oral, Daily    amLODIPine (Norvasc) 10 mg tablet TAKE 1 TABLET BY MOUTH EVERY DAY *PATIENT NEEDS APPOINTMENT*    carvedilol (COREG) 25 mg, oral, 2 times daily (morning and late afternoon)    chlorthalidone (HYGROTON) 25 mg, oral, Daily    Eliquis 5 mg, oral, 2 times daily    ergocalciferol (VITAMIN D-2) 50,000 Units, oral, Every 14 days    gabapentin (NEURONTIN) 100 mg, oral, Every 6 hours during day    hydrALAZINE (APRESOLINE) 10 mg, oral, 3 times daily    irbesartan (AVAPRO) 150 mg, oral, Daily    potassium chloride CR (Klor-Con) 10 mEq ER tablet 10 mEq, oral, Daily, Do not crush, chew, or split.        Detailed family history and social history reviewed in detail and updated per epic charting and in detail with the patient    Physical exam:  There were no vitals filed for this visit.    Phone virtual visit    Radiology review  Reviewed in detail personally and for detail see results in EPIC  CT 1/23 - renal cysts not enhanced hard to eval, node near pancreas 1.8x2.8 cm, PET PSMA- 2/23 -increased uptake in the prostate  cancer and external iliac lymph node nonspecific subcutaneous nodule in the left lower abdomen no uptake in the interpolar kidney cyst, no other uptake seen including  body near the pancreas,  MRI pancreas 4/23  -  mildly enlarged perihepatic LN, 1.9cm, non sepcific  MRI abdomen 3/24- stable to decreased prominent peripancreatic lymph node nonspecific cholelithiasis without cholecystitis, incompletely characterized and partially visualized mild circumferential thickening of the transverse and proximal descending colon possible colitis subcentimeter hepatic cysts simple renal cyst    Genomics Data    Laboratory review  Reviewed in detail personally and for detail see results in EPIC  Lab Results   Component Value Date    WBC 4.1 (L) 10/03/2024    HGB 13.4 (L) 10/03/2024    HCT 42.3 10/03/2024    MCV 83 10/03/2024     10/03/2024     Lab Results   Component Value Date    GLUCOSE 108 (H) 10/03/2024    GLUCOSE 105 (H) 10/03/2024    CALCIUM 9.1 10/03/2024    CALCIUM 9.1 10/03/2024     10/03/2024     10/03/2024    K 3.5 10/03/2024    K 3.7 10/03/2024    CO2 24 10/03/2024    CO2 23 10/03/2024     10/03/2024     10/03/2024    BUN 28 (H) 10/03/2024    BUN 28 (H) 10/03/2024    CREATININE 2.05 (H) 10/03/2024    CREATININE 2.12 (H) 10/03/2024     Lab Results   Component Value Date    PSA 0.15 10/03/2024    PSA <0.10 05/21/2024    PSA 0.19 02/16/2024     Lab Results   Component Value Date    ALT 12 10/03/2024    AST 16 10/03/2024    ALKPHOS 99 10/03/2024    BILITOT 0.3 10/03/2024     Lab Results   Component Value Date    TESTOSTERONE 310 10/03/2024         ASSESSMENT AND PLAN   Prostate Cancer -the patient does have recovery of testosterone PSA slight increase for now continue to monitor risk benefits discussed in detail.  We will go ahead and arrange follow-up in 3 months with labs prior continue to monitor PSA aware the risk benefits and alternatives continue follow-up.  Pancytopenia-improvement continue to monitor and follow-up with hematology  Pancreas Findings-discussed MRI findings shows continued improvement nonspecific in nature could potentially do yearly versus no further ongoing monitoring.   Discussed pros and cons and prefers yearly we will get again in March 2025  CKD continue to monitor with nephrology  Colon findings -discussed in detail nonspecific finding seen on the MRI pros and cons of colonoscopy which he is unclear when he had, discussed potential referral and was agreeable to proceed moving forward and consult was placed for GI.    discussed need while on ADT  maintain adequate cardiovascular health, exercise, dietary modifications,  bone health with calcium 1000 mg with vitamin D 400-800 international units      Elder Perez MD  Senior Attending Physician/  in Medicine [employer] [facility] [employer]   [facility]  Patient line [phone]  Fax [phone]

## 2024-10-07 ENCOUNTER — TELEMEDICINE (OUTPATIENT)
Dept: HEMATOLOGY/ONCOLOGY | Facility: CLINIC | Age: 64
End: 2024-10-07
Payer: COMMERCIAL

## 2024-10-07 DIAGNOSIS — K86.89 MASS OF PANCREAS (HHS-HCC): Primary | ICD-10-CM

## 2024-10-07 DIAGNOSIS — C61 PROSTATE CANCER (MULTI): ICD-10-CM

## 2024-10-07 PROCEDURE — 99443 PR PHYS/QHP TELEPHONE EVALUATION 21-30 MIN: CPT | Performed by: INTERNAL MEDICINE

## 2024-10-07 PROCEDURE — 3048F LDL-C <100 MG/DL: CPT | Performed by: INTERNAL MEDICINE

## 2024-10-07 PROCEDURE — 3044F HG A1C LEVEL LT 7.0%: CPT | Performed by: INTERNAL MEDICINE

## 2024-10-07 PROCEDURE — 4010F ACE/ARB THERAPY RXD/TAKEN: CPT | Performed by: INTERNAL MEDICINE

## 2024-11-07 ENCOUNTER — APPOINTMENT (OUTPATIENT)
Dept: PRIMARY CARE | Facility: CLINIC | Age: 64
End: 2024-11-07
Payer: COMMERCIAL

## 2024-12-02 ENCOUNTER — APPOINTMENT (OUTPATIENT)
Dept: GASTROENTEROLOGY | Facility: CLINIC | Age: 64
End: 2024-12-02
Payer: COMMERCIAL

## 2025-01-02 ENCOUNTER — TELEPHONE (OUTPATIENT)
Dept: RADIATION ONCOLOGY | Facility: HOSPITAL | Age: 65
End: 2025-01-02
Payer: COMMERCIAL

## 2025-01-02 ASSESSMENT — ENCOUNTER SYMPTOMS
SHORTNESS OF BREATH: 0
UNEXPECTED WEIGHT CHANGE: 0
ALLERGIC/IMMUNOLOGIC NEGATIVE: 1
PALPITATIONS: 0
RECTAL PAIN: 0
DIZZINESS: 0
BACK PAIN: 0
COUGH: 0
CHEST TIGHTNESS: 0
BLOOD IN STOOL: 0
CONSTIPATION: 0
PSYCHIATRIC NEGATIVE: 1
HEMATURIA: 0
FEVER: 0
ANAL BLEEDING: 0
DIARRHEA: 0
JOINT SWELLING: 0
FATIGUE: 0
DIFFICULTY URINATING: 0
DYSURIA: 0
WEAKNESS: 0
ABDOMINAL PAIN: 0
ARTHRALGIAS: 0
FREQUENCY: 0

## 2025-01-02 NOTE — PROGRESS NOTES
Cancer synopsis:  Rad/onc: Herb CHATMAN  Med/onc: Dr. Perez    Prostate Cancer - Stage IV   Sites of disease - bone , nodes  Treatment  - 11/2019 .7, 12/22/2019 98.84, prostate biopsy 2/2020 - GL 4+4=8, multiple cores,   -MRI of the prostate performed January 2020 oxana mets,   2/2020 - urethral stricture status post urethroplasty, Bone scan 2/2020 - focal abnormality in the right ischium highly concerning for metastatic disease,   -ADT 3/2020 /   -Apalutamide/Erleada 4/2020 - rad onc hold on XRT due to pandemic, dose modified 9/28/2020 2 tabs  -Admission 10/2020 - persistent difficulty with hypertensive crisis and noncompliance, stable echo, discharged on amlodipine, hydrochlorothiazide, hydralazine, clonidine  -Modified dose of 2 tabs 11/2020- yet unclear if compliant, due to cost  -Admission 12/24/20 - Kidney stones, CRISTIN, UTI, elevated BP  -Lost to follow up non compliance and did get one dose of ADT 11/21 last dose 3 m and refused XRT  -Admission 11/30/22 / Kidney stones / abd pain / CRISTIN / s/p cysto / removal of stones / left stent/ uncontrolled HTN, Admission 12/22  C diff / f/u with urology  / repeat work up no e/o mets oxana disease and primary disease (PET PSMA 2/23 - see below no clear bony met)  3/9/2023: Starts ADT- 3m Trelstar  , Radiation to Prostate and node 5/23/23, last ADT 6/23 3m dose, non compliance and refused ongoing treatment monitored only    History of presenting illness:    Patient ID: [id number]     Rei Alcocer is a 64 y.o. male who presents for his very high risk locally metastatic Stage IV prostate cancer now s/p RT and completed hormonal therapy early (was non compliant and refused further ADT)    RT Site: prostate and Ln  RT Date: 05/23/2023  Hormone therapy: Yes completed 3m and refused further  Hot Flushes: Denies  Fatigue: Denies  Bone pain: Denies  ED: Admits to ongoing ED that started after RT and is concerned at this time.  - Quality of erections during the last 4  weeks: 1 = None at all  - Use of erectile dysfunction medications:  None  IPSS: did not complete  Urinary symptoms: Denies dysuria, hematuria, frequency or urine leakage. Denies weak stream. States bladder is emptying all the way. Does report nocturia about 3 times a night but comfortable with current amount.  Urinary Medications: No  Rectal bleeding: Denies  Colonoscopy: none on file  Other systems: Denies SOB, CP or fever. States is feeling the best he has in the last year and a half.    Review of systems:  Review of Systems   Constitutional:  Negative for fatigue, fever and unexpected weight change.   Respiratory:  Negative for cough, chest tightness and shortness of breath.    Cardiovascular:  Negative for chest pain, palpitations and leg swelling.   Gastrointestinal:  Negative for abdominal pain, anal bleeding, blood in stool, constipation, diarrhea and rectal pain.   Endocrine: Negative for cold intolerance, heat intolerance and polyuria.   Genitourinary:  Negative for decreased urine volume, difficulty urinating, dysuria, frequency, hematuria and urgency.   Musculoskeletal:  Negative for arthralgias, back pain, gait problem and joint swelling.   Skin: Negative.    Allergic/Immunologic: Negative.    Neurological:  Negative for dizziness, syncope and weakness.   Psychiatric/Behavioral: Negative.       PERFORMANCE STATUS:  KPS/ECO, Able to carry on normal activity; minor signs or symptoms of disease (ECOG equivalent 0)    Past Medical history  No past medical history on file.     Surgical/family history  No family history on file.   Past Surgical History:   Procedure Laterality Date    IR  BLADDER ASPIRATION  2019    IR  BLADDER ASPIRATION 2019 [facility] CLINICAL LEGACY    OTHER SURGICAL HISTORY  10/23/2020    Urethral dilation    OTHER SURGICAL HISTORY  10/23/2020    Ankle fracture repair      Social History  Tobacco Use: Low Risk  (2024)    Patient History     Smoking Tobacco Use: Never      Smokeless Tobacco Use: Never     Passive Exposure: Not on file       Current med list:  Current Outpatient Medications   Medication Instructions    allopurinol (ZYLOPRIM) 100 mg, oral, Daily    amLODIPine (Norvasc) 10 mg tablet TAKE 1 TABLET BY MOUTH EVERY DAY *PATIENT NEEDS APPOINTMENT*    carvedilol (COREG) 25 mg, oral, 2 times daily (morning and late afternoon)    chlorthalidone (HYGROTON) 25 mg, oral, Daily    Eliquis 5 mg, oral, 2 times daily    ergocalciferol (VITAMIN D-2) 50,000 Units, oral, Every 14 days    gabapentin (NEURONTIN) 100 mg, oral, Every 6 hours during day    hydrALAZINE (APRESOLINE) 10 mg, oral, 3 times daily    irbesartan (AVAPRO) 150 mg, oral, Daily    potassium chloride CR (Klor-Con) 10 mEq ER tablet 10 mEq, oral, Daily, Do not crush, chew, or split.      Last recorded vital:  Virtual    Physical exam:  Virtual  Pertinent labs:  Prostate Specific AG   Date/Time Value Ref Range Status   10/03/2024 01:46 PM 0.15 <=4.00 ng/mL Final     Dx:  Problem List Items Addressed This Visit    None  Visit Diagnoses       Malignant neoplasm of prostate (Multi)    -  Primary    Relevant Orders    Clinic Appointment Request Follow Up; [facility]IN[facility] (virtual); [facility] (virtual) (Completed)        CBC improved. Testosterone now normal and stable PSA at 0.15.    Review of latent SE including rectal bleeding, hematuria, urinary strictures, ED where reviewed as well as how to contact office if s/s present. Denies latent SE. Will plan for 6m FUV if without latent SE will move to PRN w/ future FUV w/ med/onc for survivorship of prostate cancer.     Discussed ED and inability for PDEi given CHF, advised consult with Dr. Nelson to discussed ICP and or trimex. Patient was interested and requested referral.    PLAN:  FUV 6m virtual  Labs per med/onc today, repeat labs in 3m   Imaging none  FUV other providers: PCP for routine evals, med/onc for prostate cancer survivorship, nephrology for CKD.    Please  contact office with any concerns:  Joel Davis [facility]  [phone]

## 2025-01-03 ENCOUNTER — HOSPITAL ENCOUNTER (OUTPATIENT)
Dept: RADIATION ONCOLOGY | Facility: HOSPITAL | Age: 65
Setting detail: RADIATION/ONCOLOGY SERIES
Discharge: HOME | End: 2025-01-03
Payer: COMMERCIAL

## 2025-01-03 DIAGNOSIS — N52.35 ERECTILE DYSFUNCTION FOLLOWING RADIATION THERAPY: ICD-10-CM

## 2025-01-03 DIAGNOSIS — C61 MALIGNANT NEOPLASM OF PROSTATE (MULTI): Primary | ICD-10-CM

## 2025-01-03 PROCEDURE — 99212 OFFICE O/P EST SF 10 MIN: CPT

## 2025-01-03 PROCEDURE — 99212 OFFICE O/P EST SF 10 MIN: CPT | Mod: 95

## 2025-01-12 NOTE — PROGRESS NOTES
Oncology Follow up Note  Phone visit / consent obtained    Cancer History  Prostate Cancer - Stage IV   Sites of disease - bone , nodes  Treatment  - 11/2019 .7, 12/22/2019 98.84, prostate biopsy 2/2020 - GL 4+4=8, multiple cores,   -MRI of the prostate performed January 2020 ox[facility] mets,   2/2020 - urethral stricture status post urethroplasty, Bone scan 2/2020 - focal abnormality in the right ischium highly concerning for metastatic disease,   -ADT 3/2020 /   -Apalutamide/Erleada 4/2020 - rad onc hold on XRT due to pandemic, dose modified 9/28/2020 2 tabs  -Admission 10/2020 - persistent difficulty with hypertensive crisis and noncompliance, stable echo, discharged on amlodipine, hydrochlorothiazide, hydralazine, clonidine  -Modified dose of 2 tabs 11/2020- yet unclear if compliant, due to cost  -Admission 12/24/20 - Kidney stones, CRISTIN, UTI, elevated BP  -Lost to follow up non compliance and did get one dose of ADT 11/21 last dose 3 m and refused XRT  -Admission 11/30/22 / Kidney stones / abd pain / CRISTIN / s/p cysto / removal of stones / left stent/ uncontrolled HTN, Admission 12/22  C diff / f/u with urology  / repeat work up no e/o mets oxana disease and primary disease (PET PSMA 2/23 - see below no clear bony met)  3/9/2023: Starts ADT- 3m Trelstar  , Radiation to Prostate and node 5/23/23, last ADT 6/23 3m dose, non compliance and refused ongoing treatment monitored only    Provider Team  Elder Perez MD Barbara B Vizy, MD Alvin Schmaier  [facility] - cardiology  Dr Leslie   Wife - [phone]  Asuncion Boother Herb / Nayeli Masters    Other contributing history  HTN, Arthritis, Urethral stricutre, cysto negative, SP catheter, urethroplasty 2/18/2020, plate fabian , CKD, Afibr on eliquis , pancytopenia heme referral improved    Interval history:  Patient is without any major new symptoms appetite and energy as well denies any ongoing issues with fevers or chills denies any chest pain or chest  tightness.  Denies any other major new complaints.  Him and his wife are on the phone.    ROS:  10-pt ROS reviewed and negative except as mentioned above.    Medications: below was reviewed and is accurate  Current Outpatient Medications   Medication Instructions    allopurinol (ZYLOPRIM) 100 mg, oral, Daily    amLODIPine (Norvasc) 10 mg tablet TAKE 1 TABLET BY MOUTH EVERY DAY *PATIENT NEEDS APPOINTMENT*    carvedilol (COREG) 25 mg, oral, 2 times daily (morning and late afternoon)    chlorthalidone (HYGROTON) 25 mg, oral, Daily    Eliquis 5 mg, oral, 2 times daily    ergocalciferol (VITAMIN D-2) 50,000 Units, oral, Every 14 days    gabapentin (NEURONTIN) 100 mg, oral, Every 6 hours during day    hydrALAZINE (APRESOLINE) 10 mg, oral, 3 times daily    irbesartan (AVAPRO) 150 mg, oral, Daily    potassium chloride CR (Klor-Con) 10 mEq ER tablet 10 mEq, oral, Daily, Do not crush, chew, or split.        Detailed family history and social history reviewed in detail and updated per epic charting and in detail with the patient    Physical exam:  There were no vitals filed for this visit.    Phone virtual visit    Radiology review  Reviewed in detail personally and for detail see results in EPIC  CT 1/23 - renal cysts not enhanced hard to eval, node near pancreas 1.8x2.8 cm, PET PSMA- 2/23 -increased uptake in the prostate  cancer and external iliac lymph node nonspecific subcutaneous nodule in the left lower abdomen no uptake in the interpolar kidney cyst, no other uptake seen including  body near the pancreas,  MRI pancreas 4/23  - mildly enlarged perihepatic LN, 1.9cm, non sepcific  MRI abdomen 3/24- stable to decreased prominent peripancreatic lymph node nonspecific cholelithiasis without cholecystitis, incompletely characterized and partially visualized mild circumferential thickening of the transverse and proximal descending colon possible colitis subcentimeter hepatic cysts simple renal cyst    Genomics  Data    Laboratory review  Reviewed in detail personally and for detail see results in EPIC  Lab Results   Component Value Date    WBC 4.1 (L) 10/03/2024    HGB 13.4 (L) 10/03/2024    HCT 42.3 10/03/2024    MCV 83 10/03/2024     10/03/2024     Lab Results   Component Value Date    GLUCOSE 108 (H) 10/03/2024    GLUCOSE 105 (H) 10/03/2024    CALCIUM 9.1 10/03/2024    CALCIUM 9.1 10/03/2024     10/03/2024     10/03/2024    K 3.5 10/03/2024    K 3.7 10/03/2024    CO2 24 10/03/2024    CO2 23 10/03/2024     10/03/2024     10/03/2024    BUN 28 (H) 10/03/2024    BUN 28 (H) 10/03/2024    CREATININE 2.05 (H) 10/03/2024    CREATININE 2.12 (H) 10/03/2024     Lab Results   Component Value Date    PSA 0.15 10/03/2024    PSA <0.10 05/21/2024    PSA 0.19 02/16/2024     Lab Results   Component Value Date    ALT 12 10/03/2024    AST 16 10/03/2024    ALKPHOS 99 10/03/2024    BILITOT 0.3 10/03/2024     Lab Results   Component Value Date    TESTOSTERONE 310 10/03/2024         ASSESSMENT AND PLAN   Prostate Cancer -repeat labs are pending we will contact him once we get the results if overall stable continue to monitor he wants to continue to hold off on treatment and arrange a follow-up in 2 months.  He will contact us for any other new complaints  Pancytopenia-improvement continue to monitor and follow-up with hematology  Pancreas Findings-discussed MRI findings shows continued improvement nonspecific in nature could potentially do yearly versus no further ongoing monitoring.  Discussed pros and cons and prefers yearly we will get again in March 2025 and arrange follow-up afterwards  CKD continue to monitor with nephrology  Colon findings -discussed in detail nonspecific finding seen on the MRI pros and cons of colonoscopy initially he had approved referral but now he has refused colonoscopy he is aware of the risks benefits and alternatives    discussed need while on ADT  maintain adequate cardiovascular  health, exercise, dietary modifications,  bone health with calcium 1000 mg with vitamin D 400-800 international units      Elder Perez MD  Senior Attending Physician/  in Medicine [employer]  [facility] / [facility]  Patient line [phone]  Fax [phone]

## 2025-01-13 ENCOUNTER — TELEMEDICINE (OUTPATIENT)
Dept: HEMATOLOGY/ONCOLOGY | Facility: CLINIC | Age: 65
End: 2025-01-13
Payer: COMMERCIAL

## 2025-01-13 DIAGNOSIS — C61 PROSTATE CANCER (MULTI): ICD-10-CM

## 2025-01-13 DIAGNOSIS — K86.89 MASS OF PANCREAS (HHS-HCC): ICD-10-CM

## 2025-01-13 ASSESSMENT — PAIN SCALES - GENERAL: PAINLEVEL_OUTOF10: 0-NO PAIN

## 2025-01-17 ENCOUNTER — LAB (OUTPATIENT)
Dept: LAB | Facility: HOSPITAL | Age: 65
End: 2025-01-17
Payer: COMMERCIAL

## 2025-01-17 DIAGNOSIS — C61 PROSTATE CANCER (MULTI): ICD-10-CM

## 2025-01-17 DIAGNOSIS — K86.89 MASS OF PANCREAS (HHS-HCC): ICD-10-CM

## 2025-01-17 LAB
ALBUMIN SERPL BCP-MCNC: 4 G/DL (ref 3.4–5)
ALP SERPL-CCNC: 102 U/L (ref 33–136)
ALT SERPL W P-5'-P-CCNC: 11 U/L (ref 10–52)
ANION GAP SERPL CALC-SCNC: 11 MMOL/L (ref 10–20)
AST SERPL W P-5'-P-CCNC: 15 U/L (ref 9–39)
BASOPHILS # BLD AUTO: 0.02 X10*3/UL (ref 0–0.1)
BASOPHILS NFR BLD AUTO: 0.5 %
BILIRUB SERPL-MCNC: 0.4 MG/DL (ref 0–1.2)
BUN SERPL-MCNC: 39 MG/DL (ref 6–23)
CALCIUM SERPL-MCNC: 9 MG/DL (ref 8.6–10.3)
CHLORIDE SERPL-SCNC: 100 MMOL/L (ref 98–107)
CO2 SERPL-SCNC: 30 MMOL/L (ref 21–32)
CREAT SERPL-MCNC: 2.53 MG/DL (ref 0.5–1.3)
EGFRCR SERPLBLD CKD-EPI 2021: 28 ML/MIN/1.73M*2
EOSINOPHIL # BLD AUTO: 0.11 X10*3/UL (ref 0–0.7)
EOSINOPHIL NFR BLD AUTO: 2.6 %
ERYTHROCYTE [DISTWIDTH] IN BLOOD BY AUTOMATED COUNT: 15.4 % (ref 11.5–14.5)
GLUCOSE SERPL-MCNC: 87 MG/DL (ref 74–99)
HCT VFR BLD AUTO: 44.6 % (ref 41–52)
HGB BLD-MCNC: 14 G/DL (ref 13.5–17.5)
IMM GRANULOCYTES # BLD AUTO: 0.01 X10*3/UL (ref 0–0.7)
IMM GRANULOCYTES NFR BLD AUTO: 0.2 % (ref 0–0.9)
LYMPHOCYTES # BLD AUTO: 0.78 X10*3/UL (ref 1.2–4.8)
LYMPHOCYTES NFR BLD AUTO: 18.4 %
MCH RBC QN AUTO: 25.9 PG (ref 26–34)
MCHC RBC AUTO-ENTMCNC: 31.4 G/DL (ref 32–36)
MCV RBC AUTO: 82 FL (ref 80–100)
MONOCYTES # BLD AUTO: 0.71 X10*3/UL (ref 0.1–1)
MONOCYTES NFR BLD AUTO: 16.7 %
NEUTROPHILS # BLD AUTO: 2.62 X10*3/UL (ref 1.2–7.7)
NEUTROPHILS NFR BLD AUTO: 61.6 %
NRBC BLD-RTO: 0 /100 WBCS (ref 0–0)
PLATELET # BLD AUTO: 179 X10*3/UL (ref 150–450)
POTASSIUM SERPL-SCNC: 3.8 MMOL/L (ref 3.5–5.3)
PROT SERPL-MCNC: 8.6 G/DL (ref 6.4–8.2)
PSA SERPL-MCNC: 0.32 NG/ML
RBC # BLD AUTO: 5.41 X10*6/UL (ref 4.5–5.9)
SODIUM SERPL-SCNC: 137 MMOL/L (ref 136–145)
TESTOST SERPL-MCNC: 334 NG/DL (ref 240–1000)
WBC # BLD AUTO: 4.3 X10*3/UL (ref 4.4–11.3)

## 2025-01-17 PROCEDURE — 84403 ASSAY OF TOTAL TESTOSTERONE: CPT

## 2025-01-17 PROCEDURE — 84075 ASSAY ALKALINE PHOSPHATASE: CPT

## 2025-01-17 PROCEDURE — 85025 COMPLETE CBC W/AUTO DIFF WBC: CPT

## 2025-01-17 PROCEDURE — 84153 ASSAY OF PSA TOTAL: CPT

## 2025-01-17 PROCEDURE — 36415 COLL VENOUS BLD VENIPUNCTURE: CPT

## 2025-01-21 ENCOUNTER — TELEPHONE (OUTPATIENT)
Dept: HEMATOLOGY/ONCOLOGY | Facility: HOSPITAL | Age: 65
End: 2025-01-21
Payer: COMMERCIAL

## 2025-01-21 NOTE — TELEPHONE ENCOUNTER
Updated  Needs f/up not scheduled  Slight rise in PSA  Repeat in 6weeks  Slight rise in creatinine  F/up nephrologist and repeat in 6 weeks  Push hydration

## 2025-02-28 ENCOUNTER — APPOINTMENT (OUTPATIENT)
Dept: RADIOLOGY | Facility: HOSPITAL | Age: 65
End: 2025-02-28
Payer: COMMERCIAL

## 2025-03-01 ENCOUNTER — HOSPITAL ENCOUNTER (OUTPATIENT)
Dept: RADIOLOGY | Facility: HOSPITAL | Age: 65
Discharge: HOME | End: 2025-03-01
Payer: COMMERCIAL

## 2025-03-01 DIAGNOSIS — C61 PROSTATE CANCER (MULTI): ICD-10-CM

## 2025-03-01 DIAGNOSIS — K86.89 MASS OF PANCREAS (HHS-HCC): ICD-10-CM

## 2025-03-01 PROCEDURE — 74181 MRI ABDOMEN W/O CONTRAST: CPT | Performed by: RADIOLOGY

## 2025-03-01 PROCEDURE — 74181 MRI ABDOMEN W/O CONTRAST: CPT

## 2025-03-10 ENCOUNTER — APPOINTMENT (OUTPATIENT)
Dept: HEMATOLOGY/ONCOLOGY | Facility: CLINIC | Age: 65
End: 2025-03-10
Payer: COMMERCIAL

## 2025-03-17 ENCOUNTER — DOCUMENTATION (OUTPATIENT)
Dept: HEMATOLOGY/ONCOLOGY | Facility: CLINIC | Age: 65
End: 2025-03-17
Payer: COMMERCIAL

## 2025-03-22 ENCOUNTER — APPOINTMENT (OUTPATIENT)
Dept: RADIOLOGY | Facility: HOSPITAL | Age: 65
End: 2025-03-22
Payer: COMMERCIAL

## 2025-04-28 ENCOUNTER — APPOINTMENT (OUTPATIENT)
Dept: PRIMARY CARE | Facility: CLINIC | Age: 65
End: 2025-04-28
Payer: COMMERCIAL

## 2025-05-28 ENCOUNTER — APPOINTMENT (OUTPATIENT)
Dept: HEMATOLOGY/ONCOLOGY | Facility: HOSPITAL | Age: 65
End: 2025-05-28
Payer: COMMERCIAL

## 2025-07-10 ASSESSMENT — ENCOUNTER SYMPTOMS
SHORTNESS OF BREATH: 0
ALLERGIC/IMMUNOLOGIC NEGATIVE: 1
COUGH: 0
PALPITATIONS: 0
FATIGUE: 0
DIZZINESS: 0
DIARRHEA: 0
FEVER: 0
DYSURIA: 0
BLOOD IN STOOL: 0
ANAL BLEEDING: 0
FREQUENCY: 0
RECTAL PAIN: 0
ABDOMINAL PAIN: 0
WEAKNESS: 0
ARTHRALGIAS: 0
JOINT SWELLING: 0
BACK PAIN: 0
HEMATURIA: 0
CONSTIPATION: 0
DIFFICULTY URINATING: 0
UNEXPECTED WEIGHT CHANGE: 0
PSYCHIATRIC NEGATIVE: 1
CHEST TIGHTNESS: 0

## 2025-07-10 NOTE — PROGRESS NOTES
Cancer synopsis:  Rad/onc: Herb CHATMAN  Med/onc: Dr. Perez    Prostate Cancer - Stage IV   Sites of disease - bone , nodes  Treatment  - 11/2019 .7, 12/22/2019 98.84, prostate biopsy 2/2020 - GL 4+4=8, multiple cores,   -MRI of the prostate performed January 2020 oxana mets,   2/2020 - urethral stricture status post urethroplasty, Bone scan 2/2020 - focal abnormality in the right ischium highly concerning for metastatic disease,   -ADT 3/2020 /   -Apalutamide/Erleada 4/2020 - rad onc hold on XRT due to pandemic, dose modified 9/28/2020 2 tabs  -Admission 10/2020 - persistent difficulty with hypertensive crisis and noncompliance, stable echo, discharged on amlodipine, hydrochlorothiazide, hydralazine, clonidine  -Modified dose of 2 tabs 11/2020- yet unclear if compliant, due to cost  -Admission 12/24/20 - Kidney stones, CRISTIN, UTI, elevated BP  -Lost to follow up non compliance and did get one dose of ADT 11/21 last dose 3 m and refused XRT  -Admission 11/30/22 / Kidney stones / abd pain / CRISTIN / s/p cysto / removal of stones / left stent/ uncontrolled HTN, Admission 12/22  C diff / f/u with urology  / repeat work up no e/o mets oxana disease and primary disease (PET PSMA 2/23 - see below no clear bony met)  3/9/2023: Starts ADT- 3m Trelstar  , Radiation to Prostate and node 5/23/23, last ADT 6/23 3m dose, non compliance and refused ongoing treatment monitored only    History of presenting illness:    Patient ID: [id number]     Rei Alcocer is a 64 y.o. male who presents for his very high risk locally metastatic Stage IV prostate cancer now s/p RT and completed hormonal therapy early (was non compliant and refused further ADT)    RT Site: prostate and Ln  RT Date: 05/23/2023  Hormone therapy: Yes completed 3m and refused further  Hot Flushes: Denies  Fatigue: Denies  Bone pain: Denies  ED: Admits to ongoing ED that started after RT and is concerned at this time.  - Quality of erections during the last 4  weeks: 1 = None at all  - Use of erectile dysfunction medications:  None  IPSS: did not complete  Urinary symptoms: Denies dysuria, hematuria, frequency or urine leakage. Denies weak stream. States bladder is emptying all the way. Does report nocturia about 3 times a night but comfortable with current amount.  Urinary Medications: No  Rectal bleeding: Denies  Colonoscopy: none on file  Other systems: Denies SOB, CP or fever. States is feeling the best he has in the last year and a half.    Review of systems:  Review of Systems   Constitutional:  Negative for fatigue, fever and unexpected weight change.   Respiratory:  Negative for cough, chest tightness and shortness of breath.    Cardiovascular:  Negative for chest pain, palpitations and leg swelling.   Gastrointestinal:  Negative for abdominal pain, anal bleeding, blood in stool, constipation, diarrhea and rectal pain.   Endocrine: Negative for cold intolerance, heat intolerance and polyuria.   Genitourinary:  Negative for decreased urine volume, difficulty urinating, dysuria, frequency, hematuria and urgency.   Musculoskeletal:  Negative for arthralgias, back pain, gait problem and joint swelling.   Skin: Negative.    Allergic/Immunologic: Negative.    Neurological:  Negative for dizziness, syncope and weakness.   Psychiatric/Behavioral: Negative.       PERFORMANCE STATUS:  KPS/ECO, Able to carry on normal activity; minor signs or symptoms of disease (ECOG equivalent 0)    Past Medical history  No past medical history on file.     Surgical/family history  No family history on file.   Past Surgical History:   Procedure Laterality Date    IR  BLADDER ASPIRATION  2019    IR  BLADDER ASPIRATION 2019 [facility]    OTHER SURGICAL HISTORY  10/23/2020    Urethral dilation    OTHER SURGICAL HISTORY  10/23/2020    Ankle fracture repair      Social History  Tobacco Use: Low Risk  (2024)    Patient History     Smoking Tobacco Use: Never      Smokeless Tobacco Use: Never     Passive Exposure: Not on file       Current med list:  Current Outpatient Medications   Medication Instructions    amLODIPine (Norvasc) 10 mg tablet TAKE 1 TABLET BY MOUTH EVERY DAY *PATIENT NEEDS APPOINTMENT*    carvedilol (COREG) 25 mg, oral, 2 times daily (morning and late afternoon)    chlorthalidone (HYGROTON) 25 mg, oral, Daily    Eliquis 5 mg, oral, 2 times daily    ergocalciferol (VITAMIN D-2) 50,000 Units, oral, Every 14 days    gabapentin (NEURONTIN) 100 mg, oral, Every 6 hours during day    hydrALAZINE (APRESOLINE) 10 mg, oral, 3 times daily    irbesartan (AVAPRO) 150 mg, oral, Daily      Last recorded vital:  Virtual    Physical exam:  Virtual  Pertinent labs:  Prostate Specific AG   Date/Time Value Ref Range Status   01/17/2025 04:21 PM 0.32 <=4.00 ng/mL Final     Dx:  Problem List Items Addressed This Visit    None  Due for labs today will call with results.    Review of latent SE including rectal bleeding, hematuria, urinary strictures, ED where reviewed as well as how to contact office if s/s present. Denies latent SE. Will plan for 6m FUV if without latent SE will move to PRN w/ future FUV w/ med/onc for survivorship of prostate cancer.     PLAN:  FUV 3m virtual  Labs per med/onc today, repeat labs in 3m   Imaging none  FUV other providers: PCP for routine evals, med/onc for prostate cancer survivorship, nephrology for CKD.    Please contact office with any concerns:  Joel Davis [facility]  [phone]

## 2025-07-11 ENCOUNTER — HOSPITAL ENCOUNTER (OUTPATIENT)
Dept: RADIATION ONCOLOGY | Facility: HOSPITAL | Age: 65
Setting detail: RADIATION/ONCOLOGY SERIES
Discharge: HOME | End: 2025-07-11
Payer: COMMERCIAL

## 2025-07-11 ENCOUNTER — TELEPHONE (OUTPATIENT)
Dept: RADIATION ONCOLOGY | Facility: HOSPITAL | Age: 65
End: 2025-07-11
Payer: COMMERCIAL

## 2025-07-11 DIAGNOSIS — C61 MALIGNANT NEOPLASM OF PROSTATE (MULTI): Primary | ICD-10-CM

## 2025-09-11 ENCOUNTER — APPOINTMENT (OUTPATIENT)
Dept: PRIMARY CARE | Facility: CLINIC | Age: 65
End: 2025-09-11
Payer: COMMERCIAL

## 2025-09-17 ENCOUNTER — APPOINTMENT (OUTPATIENT)
Dept: HEMATOLOGY/ONCOLOGY | Facility: HOSPITAL | Age: 65
End: 2025-09-17
Payer: COMMERCIAL